# Patient Record
Sex: MALE | Race: WHITE | NOT HISPANIC OR LATINO
[De-identification: names, ages, dates, MRNs, and addresses within clinical notes are randomized per-mention and may not be internally consistent; named-entity substitution may affect disease eponyms.]

---

## 2017-06-25 PROBLEM — Z00.00 ENCOUNTER FOR PREVENTIVE HEALTH EXAMINATION: Status: ACTIVE | Noted: 2017-06-25

## 2017-08-18 ENCOUNTER — APPOINTMENT (OUTPATIENT)
Dept: VASCULAR SURGERY | Facility: CLINIC | Age: 77
End: 2017-08-18
Payer: MEDICARE

## 2017-08-18 VITALS
SYSTOLIC BLOOD PRESSURE: 124 MMHG | WEIGHT: 190 LBS | DIASTOLIC BLOOD PRESSURE: 72 MMHG | HEIGHT: 71 IN | BODY MASS INDEX: 26.6 KG/M2

## 2017-08-18 DIAGNOSIS — I10 ESSENTIAL (PRIMARY) HYPERTENSION: ICD-10-CM

## 2017-08-18 DIAGNOSIS — Z87.891 PERSONAL HISTORY OF NICOTINE DEPENDENCE: ICD-10-CM

## 2017-08-18 DIAGNOSIS — E78.00 PURE HYPERCHOLESTEROLEMIA, UNSPECIFIED: ICD-10-CM

## 2017-08-18 PROCEDURE — 93978 VASCULAR STUDY: CPT

## 2017-08-18 PROCEDURE — 99213 OFFICE O/P EST LOW 20 MIN: CPT

## 2017-08-18 RX ORDER — ATORVASTATIN CALCIUM 10 MG/1
10 TABLET, FILM COATED ORAL
Refills: 0 | Status: ACTIVE | COMMUNITY

## 2017-08-18 RX ORDER — PNV NO.95/FERROUS FUM/FOLIC AC 28MG-0.8MG
TABLET ORAL
Refills: 0 | Status: ACTIVE | COMMUNITY

## 2017-08-18 RX ORDER — VALSARTAN 80 MG/1
80 TABLET, COATED ORAL
Refills: 0 | Status: ACTIVE | COMMUNITY

## 2018-02-13 ENCOUNTER — APPOINTMENT (OUTPATIENT)
Dept: VASCULAR SURGERY | Facility: CLINIC | Age: 78
End: 2018-02-13
Payer: MEDICARE

## 2018-02-13 VITALS
BODY MASS INDEX: 26.6 KG/M2 | SYSTOLIC BLOOD PRESSURE: 130 MMHG | WEIGHT: 190 LBS | DIASTOLIC BLOOD PRESSURE: 70 MMHG | HEIGHT: 71 IN

## 2018-02-13 PROCEDURE — 93978 VASCULAR STUDY: CPT

## 2018-02-13 PROCEDURE — 99213 OFFICE O/P EST LOW 20 MIN: CPT

## 2019-02-21 ENCOUNTER — OTHER (OUTPATIENT)
Age: 79
End: 2019-02-21

## 2019-02-22 ENCOUNTER — APPOINTMENT (OUTPATIENT)
Dept: VASCULAR SURGERY | Facility: CLINIC | Age: 79
End: 2019-02-22
Payer: MEDICARE

## 2019-02-22 VITALS
WEIGHT: 190 LBS | DIASTOLIC BLOOD PRESSURE: 80 MMHG | SYSTOLIC BLOOD PRESSURE: 130 MMHG | BODY MASS INDEX: 26.6 KG/M2 | HEIGHT: 71 IN

## 2019-02-22 PROCEDURE — 99213 OFFICE O/P EST LOW 20 MIN: CPT

## 2019-02-22 PROCEDURE — 93978 VASCULAR STUDY: CPT

## 2019-02-22 NOTE — HISTORY OF PRESENT ILLNESS
[FreeTextEntry1] : 77 y/o gentleman with h/o AAA who underwent EVAR in December 2014, presents for routine follow up.

## 2019-02-22 NOTE — ASSESSMENT
[FreeTextEntry1] : 77 y/o gentleman with h/o AAA who underwent EVAR in December 2014, presents for routine follow up. I performed an arterial duplex which was medically necessary to evaluate for patency of the endograft. It showed patent endograft without any endoleak and residual sac decreased to 3.5 cm. No need for a CTA at this point. Followup duplex in 6 months

## 2019-02-22 NOTE — DATA REVIEWED
[FreeTextEntry1] : I performed an arterial duplex which was medically necessary to evaluate for patency of the endograft. It showed patent endograft without any endoleak and residual sac decreased to 3.5 cm.\par

## 2019-08-23 ENCOUNTER — APPOINTMENT (OUTPATIENT)
Dept: VASCULAR SURGERY | Facility: CLINIC | Age: 79
End: 2019-08-23

## 2019-09-13 ENCOUNTER — APPOINTMENT (OUTPATIENT)
Dept: VASCULAR SURGERY | Facility: CLINIC | Age: 79
End: 2019-09-13
Payer: MEDICARE

## 2019-09-13 VITALS
DIASTOLIC BLOOD PRESSURE: 84 MMHG | BODY MASS INDEX: 26.6 KG/M2 | HEIGHT: 71 IN | WEIGHT: 190 LBS | SYSTOLIC BLOOD PRESSURE: 126 MMHG

## 2019-09-13 PROCEDURE — 99213 OFFICE O/P EST LOW 20 MIN: CPT

## 2019-09-13 PROCEDURE — 93978 VASCULAR STUDY: CPT

## 2019-09-13 NOTE — ASSESSMENT
[FreeTextEntry1] : 80 y/o gentleman with h/o AAA who underwent EVAR in December 2014, presents for routine follow up. I performed an arterial duplex which was medically necessary to evaluate for patency of the endograft. It showed patent endograft without any endoleak and residual sac decreased to 3.4 cm. No need for a CTA at this point. Followup duplex in one years time.

## 2019-09-13 NOTE — DATA REVIEWED
[FreeTextEntry1] : I performed an arterial duplex which was medically necessary to evaluate for patency of the endograft. It showed patent endograft without any endoleak and residual sac decreased to 3.4 cm.\par

## 2019-09-13 NOTE — HISTORY OF PRESENT ILLNESS
[FreeTextEntry1] : 78 y/o gentleman with h/o AAA who underwent EVAR in December 2014, presents for routine follow up. Denies any complaints.

## 2020-05-15 ENCOUNTER — APPOINTMENT (OUTPATIENT)
Dept: CARDIOLOGY | Facility: CLINIC | Age: 80
End: 2020-05-15

## 2020-06-22 ENCOUNTER — OUTPATIENT (OUTPATIENT)
Dept: OUTPATIENT SERVICES | Facility: HOSPITAL | Age: 80
LOS: 1 days | Discharge: HOME | End: 2020-06-22
Payer: MEDICARE

## 2020-06-22 VITALS
HEART RATE: 70 BPM | SYSTOLIC BLOOD PRESSURE: 132 MMHG | HEIGHT: 71 IN | TEMPERATURE: 97 F | WEIGHT: 176.37 LBS | RESPIRATION RATE: 16 BRPM | OXYGEN SATURATION: 99 % | DIASTOLIC BLOOD PRESSURE: 78 MMHG

## 2020-06-22 DIAGNOSIS — K40.90 UNILATERAL INGUINAL HERNIA, WITHOUT OBSTRUCTION OR GANGRENE, NOT SPECIFIED AS RECURRENT: ICD-10-CM

## 2020-06-22 DIAGNOSIS — Z95.828 PRESENCE OF OTHER VASCULAR IMPLANTS AND GRAFTS: Chronic | ICD-10-CM

## 2020-06-22 DIAGNOSIS — Z01.818 ENCOUNTER FOR OTHER PREPROCEDURAL EXAMINATION: ICD-10-CM

## 2020-06-22 LAB
ALBUMIN SERPL ELPH-MCNC: 4.8 G/DL — SIGNIFICANT CHANGE UP (ref 3.5–5.2)
ALP SERPL-CCNC: 73 U/L — SIGNIFICANT CHANGE UP (ref 30–115)
ALT FLD-CCNC: 18 U/L — SIGNIFICANT CHANGE UP (ref 0–41)
ANION GAP SERPL CALC-SCNC: 14 MMOL/L — SIGNIFICANT CHANGE UP (ref 7–14)
APPEARANCE UR: CLEAR — SIGNIFICANT CHANGE UP
APTT BLD: 27.7 SEC — SIGNIFICANT CHANGE UP (ref 27–39.2)
AST SERPL-CCNC: 15 U/L — SIGNIFICANT CHANGE UP (ref 0–41)
BACTERIA # UR AUTO: NEGATIVE — SIGNIFICANT CHANGE UP
BASOPHILS # BLD AUTO: 0.06 K/UL — SIGNIFICANT CHANGE UP (ref 0–0.2)
BASOPHILS NFR BLD AUTO: 0.8 % — SIGNIFICANT CHANGE UP (ref 0–1)
BILIRUB SERPL-MCNC: 0.5 MG/DL — SIGNIFICANT CHANGE UP (ref 0.2–1.2)
BILIRUB UR-MCNC: NEGATIVE — SIGNIFICANT CHANGE UP
BUN SERPL-MCNC: 28 MG/DL — HIGH (ref 10–20)
CALCIUM SERPL-MCNC: 9.4 MG/DL — SIGNIFICANT CHANGE UP (ref 8.5–10.1)
CHLORIDE SERPL-SCNC: 104 MMOL/L — SIGNIFICANT CHANGE UP (ref 98–110)
CO2 SERPL-SCNC: 25 MMOL/L — SIGNIFICANT CHANGE UP (ref 17–32)
COLOR SPEC: YELLOW — SIGNIFICANT CHANGE UP
CREAT SERPL-MCNC: 1.5 MG/DL — SIGNIFICANT CHANGE UP (ref 0.7–1.5)
DIFF PNL FLD: NEGATIVE — SIGNIFICANT CHANGE UP
EOSINOPHIL # BLD AUTO: 0.09 K/UL — SIGNIFICANT CHANGE UP (ref 0–0.7)
EOSINOPHIL NFR BLD AUTO: 1.2 % — SIGNIFICANT CHANGE UP (ref 0–8)
EPI CELLS # UR: 0 /HPF — SIGNIFICANT CHANGE UP (ref 0–5)
GLUCOSE SERPL-MCNC: 66 MG/DL — LOW (ref 70–99)
GLUCOSE UR QL: NEGATIVE — SIGNIFICANT CHANGE UP
HCT VFR BLD CALC: 46.3 % — SIGNIFICANT CHANGE UP (ref 42–52)
HGB BLD-MCNC: 15.4 G/DL — SIGNIFICANT CHANGE UP (ref 14–18)
HYALINE CASTS # UR AUTO: 0 /LPF — SIGNIFICANT CHANGE UP (ref 0–7)
IMM GRANULOCYTES NFR BLD AUTO: 0.4 % — HIGH (ref 0.1–0.3)
INR BLD: 1.07 RATIO — SIGNIFICANT CHANGE UP (ref 0.65–1.3)
KETONES UR-MCNC: NEGATIVE — SIGNIFICANT CHANGE UP
LEUKOCYTE ESTERASE UR-ACNC: NEGATIVE — SIGNIFICANT CHANGE UP
LYMPHOCYTES # BLD AUTO: 2.07 K/UL — SIGNIFICANT CHANGE UP (ref 1.2–3.4)
LYMPHOCYTES # BLD AUTO: 26.5 % — SIGNIFICANT CHANGE UP (ref 20.5–51.1)
MCHC RBC-ENTMCNC: 30.4 PG — SIGNIFICANT CHANGE UP (ref 27–31)
MCHC RBC-ENTMCNC: 33.3 G/DL — SIGNIFICANT CHANGE UP (ref 32–37)
MCV RBC AUTO: 91.5 FL — SIGNIFICANT CHANGE UP (ref 80–94)
MONOCYTES # BLD AUTO: 0.72 K/UL — HIGH (ref 0.1–0.6)
MONOCYTES NFR BLD AUTO: 9.2 % — SIGNIFICANT CHANGE UP (ref 1.7–9.3)
NEUTROPHILS # BLD AUTO: 4.83 K/UL — SIGNIFICANT CHANGE UP (ref 1.4–6.5)
NEUTROPHILS NFR BLD AUTO: 61.9 % — SIGNIFICANT CHANGE UP (ref 42.2–75.2)
NITRITE UR-MCNC: NEGATIVE — SIGNIFICANT CHANGE UP
NRBC # BLD: 0 /100 WBCS — SIGNIFICANT CHANGE UP (ref 0–0)
PH UR: 6 — SIGNIFICANT CHANGE UP (ref 5–8)
PLATELET # BLD AUTO: 145 K/UL — SIGNIFICANT CHANGE UP (ref 130–400)
POTASSIUM SERPL-MCNC: 4.4 MMOL/L — SIGNIFICANT CHANGE UP (ref 3.5–5)
POTASSIUM SERPL-SCNC: 4.4 MMOL/L — SIGNIFICANT CHANGE UP (ref 3.5–5)
PROT SERPL-MCNC: 7 G/DL — SIGNIFICANT CHANGE UP (ref 6–8)
PROT UR-MCNC: ABNORMAL
PROTHROM AB SERPL-ACNC: 12.3 SEC — SIGNIFICANT CHANGE UP (ref 9.95–12.87)
RBC # BLD: 5.06 M/UL — SIGNIFICANT CHANGE UP (ref 4.7–6.1)
RBC # FLD: 12.7 % — SIGNIFICANT CHANGE UP (ref 11.5–14.5)
RBC CASTS # UR COMP ASSIST: 1 /HPF — SIGNIFICANT CHANGE UP (ref 0–4)
SODIUM SERPL-SCNC: 143 MMOL/L — SIGNIFICANT CHANGE UP (ref 135–146)
SP GR SPEC: 1.02 — SIGNIFICANT CHANGE UP (ref 1.01–1.02)
UROBILINOGEN FLD QL: SIGNIFICANT CHANGE UP
WBC # BLD: 7.8 K/UL — SIGNIFICANT CHANGE UP (ref 4.8–10.8)
WBC # FLD AUTO: 7.8 K/UL — SIGNIFICANT CHANGE UP (ref 4.8–10.8)
WBC UR QL: 2 /HPF — SIGNIFICANT CHANGE UP (ref 0–5)

## 2020-06-22 PROCEDURE — 71046 X-RAY EXAM CHEST 2 VIEWS: CPT | Mod: 26

## 2020-06-22 PROCEDURE — 93010 ELECTROCARDIOGRAM REPORT: CPT

## 2020-06-22 NOTE — H&P PST ADULT - REASON FOR ADMISSION
80 yr old man to past for laparoscopic inguinal hernia repair left side  noted x 3 mos egg sized reducible opt for surgery NO recent fever no uti yani cp palp sob pain at this time exercise tolerance is 2 flights no changes kanu screen revd neg no s/s covid has appt 6/23 for testing

## 2020-06-22 NOTE — H&P PST ADULT - NSICDXPASTMEDICALHX_GEN_ALL_CORE_FT
PAST MEDICAL HISTORY:  BPH (benign prostatic hyperplasia)     High cholesterol     S/P AAA repair 2014 stent 2014

## 2020-06-26 ENCOUNTER — OUTPATIENT (OUTPATIENT)
Dept: OUTPATIENT SERVICES | Facility: HOSPITAL | Age: 80
LOS: 1 days | Discharge: HOME | End: 2020-06-26

## 2020-06-26 VITALS
TEMPERATURE: 98 F | RESPIRATION RATE: 20 BRPM | DIASTOLIC BLOOD PRESSURE: 68 MMHG | HEART RATE: 56 BPM | OXYGEN SATURATION: 95 % | SYSTOLIC BLOOD PRESSURE: 136 MMHG | WEIGHT: 175.05 LBS | HEIGHT: 71 IN

## 2020-06-26 VITALS
DIASTOLIC BLOOD PRESSURE: 63 MMHG | OXYGEN SATURATION: 100 % | SYSTOLIC BLOOD PRESSURE: 133 MMHG | RESPIRATION RATE: 25 BRPM | HEART RATE: 57 BPM

## 2020-06-26 DIAGNOSIS — Z95.828 PRESENCE OF OTHER VASCULAR IMPLANTS AND GRAFTS: Chronic | ICD-10-CM

## 2020-06-26 RX ORDER — OXYCODONE HYDROCHLORIDE 5 MG/1
5 TABLET ORAL ONCE
Refills: 0 | Status: DISCONTINUED | OUTPATIENT
Start: 2020-06-26 | End: 2020-06-26

## 2020-06-26 RX ORDER — HYDROMORPHONE HYDROCHLORIDE 2 MG/ML
0.5 INJECTION INTRAMUSCULAR; INTRAVENOUS; SUBCUTANEOUS
Refills: 0 | Status: DISCONTINUED | OUTPATIENT
Start: 2020-06-26 | End: 2020-06-26

## 2020-06-26 RX ORDER — SODIUM CHLORIDE 9 MG/ML
1000 INJECTION, SOLUTION INTRAVENOUS
Refills: 0 | Status: DISCONTINUED | OUTPATIENT
Start: 2020-06-26 | End: 2020-07-11

## 2020-06-26 RX ORDER — TAMSULOSIN HYDROCHLORIDE 0.4 MG/1
1 CAPSULE ORAL
Qty: 0 | Refills: 0 | DISCHARGE

## 2020-06-26 RX ORDER — ONDANSETRON 8 MG/1
4 TABLET, FILM COATED ORAL ONCE
Refills: 0 | Status: DISCONTINUED | OUTPATIENT
Start: 2020-06-26 | End: 2020-07-11

## 2020-06-26 RX ORDER — ATORVASTATIN CALCIUM 80 MG/1
1 TABLET, FILM COATED ORAL
Qty: 0 | Refills: 0 | DISCHARGE

## 2020-06-26 RX ORDER — ACETAMINOPHEN 500 MG
2 TABLET ORAL
Qty: 0 | Refills: 0 | DISCHARGE

## 2020-06-26 NOTE — ASU DISCHARGE PLAN (ADULT/PEDIATRIC) - CARE PROVIDER_API CALL
Alonso Betancourt S  SURGERY  [address] [place] [id number]  Phone: [phone]  Fax: [phone]  Follow Up Time: 1 week

## 2020-06-26 NOTE — ASU DISCHARGE PLAN (ADULT/PEDIATRIC) - CALL YOUR DOCTOR IF YOU HAVE ANY OF THE FOLLOWING:
Bleeding that does not stop/Swelling that gets worse/Fever greater than (need to indicate Fahrenheit or Celsius)

## 2020-06-26 NOTE — BRIEF OPERATIVE NOTE - NSICDXBRIEFPROCEDURE_GEN_ALL_CORE_FT
PROCEDURES:  Laparoscopic herniorrhaphy of left inguinal hernia by total extraperitoneal approach 26-Jun-2020 12:39:35  Colby Shelby

## 2020-06-26 NOTE — CHART NOTE - NSCHARTNOTEFT_GEN_A_CORE
PACU ANESTHESIA ADMISSION NOTE      Procedure: Laparoscopic herniorrhaphy of left inguinal hernia by total extraperitoneal approach    Post op diagnosis:  Left inguinal hernia      ____  Intubated  TV:______       Rate: ______      FiO2: ______    _x___  Patent Airway    _x___  Full return of protective reflexes    _x___  Full recovery from anesthesia / back to baseline status    Vitals:  T 97.7 F  HR:71  BP: 118/70  RR: 17  SpO2: 98% on NC 2 L/min    Mental Status:  _x___ Awake   __x___ Alert   _____ Drowsy   _____ Sedated    Nausea/Vomiting:  _x___  NO       ______Yes,   See Post - Op Orders         Pain Scale (0-10):  __0___    Treatment: _x___ None    ____ See Post - Op/PCA Orders    Post - Operative Fluids:   ___ Oral   __x__ See Post - Op Orders    Plan: Discharge:   _x___Home       _____Floor     _____Critical Care    _____  Other:_________________    Comments: uneventful GETA anesthetic, VS stable, full report to PACU RN.   No anesthesia issues or complications noted.  Discharge when criteria met.

## 2020-07-02 DIAGNOSIS — Z87.891 PERSONAL HISTORY OF NICOTINE DEPENDENCE: ICD-10-CM

## 2020-07-02 DIAGNOSIS — K40.90 UNILATERAL INGUINAL HERNIA, WITHOUT OBSTRUCTION OR GANGRENE, NOT SPECIFIED AS RECURRENT: ICD-10-CM

## 2020-07-02 DIAGNOSIS — E78.00 PURE HYPERCHOLESTEROLEMIA, UNSPECIFIED: ICD-10-CM

## 2020-08-25 PROBLEM — N40.0 BENIGN PROSTATIC HYPERPLASIA WITHOUT LOWER URINARY TRACT SYMPTOMS: Chronic | Status: ACTIVE | Noted: 2020-06-22

## 2020-08-25 PROBLEM — E78.00 PURE HYPERCHOLESTEROLEMIA, UNSPECIFIED: Chronic | Status: ACTIVE | Noted: 2020-06-22

## 2020-08-25 PROBLEM — Z98.890 OTHER SPECIFIED POSTPROCEDURAL STATES: Chronic | Status: ACTIVE | Noted: 2020-06-22

## 2020-09-18 ENCOUNTER — APPOINTMENT (OUTPATIENT)
Dept: VASCULAR SURGERY | Facility: CLINIC | Age: 80
End: 2020-09-18

## 2020-09-18 NOTE — H&P PST ADULT - TEACHING/LEARNING LEARNING PREFERENCES
Hold ACEI / ARB, + ARABELLA, on Labetalol, ASA, Start Norvasc 5 mg PO QD from tonight, ECHO noted Hold ACEI / ARB, + ARABELLA, on Labetalol, ASA, Start Norvasc 5 mg PO QD from tonight, ECHO noted   will continue to monitor BP verbal instruction/written material

## 2020-09-22 ENCOUNTER — APPOINTMENT (OUTPATIENT)
Dept: VASCULAR SURGERY | Facility: CLINIC | Age: 80
End: 2020-09-22
Payer: MEDICARE

## 2020-09-22 VITALS
WEIGHT: 175 LBS | HEIGHT: 71 IN | DIASTOLIC BLOOD PRESSURE: 72 MMHG | BODY MASS INDEX: 24.5 KG/M2 | SYSTOLIC BLOOD PRESSURE: 178 MMHG | TEMPERATURE: 97.4 F

## 2020-09-22 PROCEDURE — 93978 VASCULAR STUDY: CPT

## 2020-09-22 PROCEDURE — 99213 OFFICE O/P EST LOW 20 MIN: CPT

## 2020-09-22 NOTE — DATA REVIEWED
[FreeTextEntry1] : I performed an arterial duplex which was medically necessary to evaluate for patency of the endograft. It showed patent endograft without any endoleak and residual sac at 3.4 cm.\par

## 2020-09-22 NOTE — HISTORY OF PRESENT ILLNESS
[FreeTextEntry1] : 81 y/o gentleman with h/o AAA who underwent EVAR in December 2014, presents for routine follow up. Denies any complaints.\par He underwent left inguinal hernia repair with Dr. Betancourt in June 2020 and has recovered. denies pain/discomfort

## 2020-09-22 NOTE — ASSESSMENT
[FreeTextEntry1] : 79 y/o gentleman with h/o AAA who underwent EVAR in December 2014, presents for routine follow up. \par \par I performed an arterial duplex which was medically necessary to evaluate for patency of the endograft. It showed patent endograft without any endoleak and residual sac at 3.4 cm.\par \par I have advised follow up in one years time.

## 2020-12-16 NOTE — ASU PREOP CHECKLIST - BSA (M2)
Pt tolerated infusion without complaints.  VSS.  Pt declined AVS.  DC per ambulation after completion of visit.     1.99

## 2021-09-08 ENCOUNTER — TRANSCRIPTION ENCOUNTER (OUTPATIENT)
Age: 81
End: 2021-09-08

## 2021-09-15 ENCOUNTER — TRANSCRIPTION ENCOUNTER (OUTPATIENT)
Age: 81
End: 2021-09-15

## 2021-09-21 ENCOUNTER — APPOINTMENT (OUTPATIENT)
Dept: VASCULAR SURGERY | Facility: CLINIC | Age: 81
End: 2021-09-21
Payer: MEDICARE

## 2021-09-21 VITALS
WEIGHT: 176 LBS | DIASTOLIC BLOOD PRESSURE: 82 MMHG | BODY MASS INDEX: 24.64 KG/M2 | TEMPERATURE: 97.6 F | HEIGHT: 71 IN | HEART RATE: 66 BPM | SYSTOLIC BLOOD PRESSURE: 164 MMHG

## 2021-09-21 PROCEDURE — 99213 OFFICE O/P EST LOW 20 MIN: CPT

## 2021-09-21 PROCEDURE — 93978 VASCULAR STUDY: CPT

## 2021-09-21 NOTE — ASSESSMENT
[FreeTextEntry1] : 82 y/o gentleman with h/o AAA who underwent EVAR in December 2014, presents for routine follow up. \par \par I performed an arterial duplex which was medically necessary to evaluate for patency of the endograft. It showed patent endograft without any endoleak and residual sac at 3.4 cm.\par \par I have advised follow up in one years time.

## 2021-09-21 NOTE — HISTORY OF PRESENT ILLNESS
[FreeTextEntry1] : 82 y/o gentleman with h/o AAA who underwent EVAR in December 2014, presents for routine follow up. Denies any complaints.\par He underwent left inguinal hernia repair with Dr. Betancourt in June 2020 and has recovered.

## 2022-05-15 ENCOUNTER — EMERGENCY (EMERGENCY)
Facility: HOSPITAL | Age: 82
LOS: 0 days | Discharge: HOME | End: 2022-05-15
Attending: EMERGENCY MEDICINE | Admitting: EMERGENCY MEDICINE
Payer: MEDICARE

## 2022-05-15 VITALS
SYSTOLIC BLOOD PRESSURE: 131 MMHG | DIASTOLIC BLOOD PRESSURE: 69 MMHG | RESPIRATION RATE: 16 BRPM | HEIGHT: 71 IN | TEMPERATURE: 98 F | OXYGEN SATURATION: 98 % | WEIGHT: 175.05 LBS | HEART RATE: 71 BPM

## 2022-05-15 VITALS
RESPIRATION RATE: 18 BRPM | DIASTOLIC BLOOD PRESSURE: 70 MMHG | HEART RATE: 72 BPM | TEMPERATURE: 98 F | SYSTOLIC BLOOD PRESSURE: 125 MMHG | OXYGEN SATURATION: 100 %

## 2022-05-15 DIAGNOSIS — R21 RASH AND OTHER NONSPECIFIC SKIN ERUPTION: ICD-10-CM

## 2022-05-15 DIAGNOSIS — E78.5 HYPERLIPIDEMIA, UNSPECIFIED: ICD-10-CM

## 2022-05-15 DIAGNOSIS — Z95.828 PRESENCE OF OTHER VASCULAR IMPLANTS AND GRAFTS: Chronic | ICD-10-CM

## 2022-05-15 DIAGNOSIS — N40.0 BENIGN PROSTATIC HYPERPLASIA WITHOUT LOWER URINARY TRACT SYMPTOMS: ICD-10-CM

## 2022-05-15 DIAGNOSIS — Z86.79 PERSONAL HISTORY OF OTHER DISEASES OF THE CIRCULATORY SYSTEM: ICD-10-CM

## 2022-05-15 DIAGNOSIS — B02.9 ZOSTER WITHOUT COMPLICATIONS: ICD-10-CM

## 2022-05-15 LAB
ALBUMIN SERPL ELPH-MCNC: 4.9 G/DL — SIGNIFICANT CHANGE UP (ref 3.5–5.2)
ALP SERPL-CCNC: 72 U/L — SIGNIFICANT CHANGE UP (ref 30–115)
ALT FLD-CCNC: 19 U/L — SIGNIFICANT CHANGE UP (ref 0–41)
ANION GAP SERPL CALC-SCNC: 15 MMOL/L — HIGH (ref 7–14)
AST SERPL-CCNC: 18 U/L — SIGNIFICANT CHANGE UP (ref 0–41)
BILIRUB SERPL-MCNC: 1.7 MG/DL — HIGH (ref 0.2–1.2)
BUN SERPL-MCNC: 26 MG/DL — HIGH (ref 10–20)
CALCIUM SERPL-MCNC: 9.4 MG/DL — SIGNIFICANT CHANGE UP (ref 8.5–10.1)
CHLORIDE SERPL-SCNC: 97 MMOL/L — LOW (ref 98–110)
CO2 SERPL-SCNC: 24 MMOL/L — SIGNIFICANT CHANGE UP (ref 17–32)
CREAT SERPL-MCNC: 1.6 MG/DL — HIGH (ref 0.7–1.5)
EGFR: 43 ML/MIN/1.73M2 — LOW
GLUCOSE SERPL-MCNC: 105 MG/DL — HIGH (ref 70–99)
HCT VFR BLD CALC: 49.6 % — SIGNIFICANT CHANGE UP (ref 42–52)
HGB BLD-MCNC: 17 G/DL — SIGNIFICANT CHANGE UP (ref 14–18)
MCHC RBC-ENTMCNC: 31.1 PG — HIGH (ref 27–31)
MCHC RBC-ENTMCNC: 34.3 G/DL — SIGNIFICANT CHANGE UP (ref 32–37)
MCV RBC AUTO: 90.7 FL — SIGNIFICANT CHANGE UP (ref 80–94)
NRBC # BLD: 0 /100 WBCS — SIGNIFICANT CHANGE UP (ref 0–0)
PLATELET # BLD AUTO: 107 K/UL — LOW (ref 130–400)
POTASSIUM SERPL-MCNC: 4 MMOL/L — SIGNIFICANT CHANGE UP (ref 3.5–5)
POTASSIUM SERPL-SCNC: 4 MMOL/L — SIGNIFICANT CHANGE UP (ref 3.5–5)
PROT SERPL-MCNC: 7.3 G/DL — SIGNIFICANT CHANGE UP (ref 6–8)
RBC # BLD: 5.47 M/UL — SIGNIFICANT CHANGE UP (ref 4.7–6.1)
RBC # FLD: 12.8 % — SIGNIFICANT CHANGE UP (ref 11.5–14.5)
SODIUM SERPL-SCNC: 136 MMOL/L — SIGNIFICANT CHANGE UP (ref 135–146)
WBC # BLD: 6.86 K/UL — SIGNIFICANT CHANGE UP (ref 4.8–10.8)
WBC # FLD AUTO: 6.86 K/UL — SIGNIFICANT CHANGE UP (ref 4.8–10.8)

## 2022-05-15 PROCEDURE — 99284 EMERGENCY DEPT VISIT MOD MDM: CPT

## 2022-05-15 RX ORDER — VALACYCLOVIR 500 MG/1
1 TABLET, FILM COATED ORAL
Qty: 21 | Refills: 0
Start: 2022-05-15 | End: 2022-05-21

## 2022-05-15 RX ORDER — VALACYCLOVIR 500 MG/1
1000 TABLET, FILM COATED ORAL ONCE
Refills: 0 | Status: DISCONTINUED | OUTPATIENT
Start: 2022-05-15 | End: 2022-05-15

## 2022-05-15 RX ORDER — ACYCLOVIR SODIUM 500 MG
1 VIAL (EA) INTRAVENOUS
Qty: 35 | Refills: 0
Start: 2022-05-15 | End: 2022-05-21

## 2022-05-15 RX ORDER — ACYCLOVIR SODIUM 500 MG
800 VIAL (EA) INTRAVENOUS ONCE
Refills: 0 | Status: COMPLETED | OUTPATIENT
Start: 2022-05-15 | End: 2022-05-15

## 2022-05-15 NOTE — ED PROVIDER NOTE - NS ED ROS FT
Review of Systems:  CONSTITUTIONAL: No fever, No diaphoresis, No weight change  SKIN: + rash to left upper back  HEMATOLOGIC: No abnormal bleeding or bruising  EYES: No eye pain, No blurred vision  ENT: No change in hearing, No sore throat, No neck pain, No rhinorrhea, No ear pain  RESPIRATORY: No shortness of breath, No cough  CARDIAC: No chest pain, No palpitations  GI: No abdominal pain, No nausea, No vomiting, No diarrhea, No constipation, No bright red blood per rectum or melena. No flank pain  : No dysuria, frequency, hematuria.   ENDO: No polydypsia, No polyuria, No heat/cold intolerance  MUSCULOSKELETAL: No joint paint, No swelling, No back pain  NEUROLOGIC: No numbness, No focal weakness, No headache, No dizziness  All other systems negative, unless specified in HPI

## 2022-05-15 NOTE — ED PROVIDER NOTE - OBJECTIVE STATEMENT
Pt is an 81 y/o male with PMH of AAA repair (2014), HLD and BPH presenting for rash x 1 week. Pt reports a painful red rash on his left upper back that has now started to extend under his left breast. Had a temp of 101 yesterday. Has been taking advil for pain. No cough, chest pain, nausea/vomiting or diarrhea.

## 2022-05-15 NOTE — ED PROVIDER NOTE - PHYSICAL EXAMINATION
CONST: well appearing for age  HEAD:  normocephalic, atraumatic  EYES:  conjunctivae without injection, drainage or discharge  ENMT:  tympanic membranes pearly gray with normal landmarks; nasal mucosa moist; mouth moist without ulcerations or lesions; throat moist without erythema, exudate, ulcerations or lesions  NECK:  supple  CARDIAC:  regular rate and rhythm, normal S1 and S2, no murmurs, rubs or gallops  RESP:  respiratory rate and effort appear normal for age; lungs are clear to auscultation bilaterally; no rales or wheezes  ABDOMEN:  soft, nontender, nondistended  LYMPHATICS:  no significant lymphadenopathy  MUSCULOSKELETAL/NEURO:  AAOX3, CN II-XII grossly intact, sensation intact throughout, normal movement, normal tone  SKIN: + erythematous vesicular mildy tender rash to T6 dermatome

## 2022-05-15 NOTE — ED ADULT TRIAGE NOTE - BP NONINVASIVE SYSTOLIC (MM HG)
131 Provider Procedure Text (D): After obtaining clear surgical margins the defect was repaired by another provider.

## 2022-05-15 NOTE — ED PROVIDER NOTE - NSFOLLOWUPCLINICS_GEN_ALL_ED_FT
[facility]  Medicine  [address] [place], [place]   Phone: [phone]  Fax:   Follow Up Time: 7-10 Days

## 2022-05-15 NOTE — ED ADULT NURSE NOTE - OBJECTIVE STATEMENT
C/o fever and rash x 1 week. Area appears red and inflamed with small area of blackened skin in center of rash

## 2022-05-15 NOTE — ED PROVIDER NOTE - ATTENDING CONTRIBUTION TO CARE
82 y.o. male, PMH of AAA repair (2014), HLD and BPH presenting for rash x 1 week. Pt reports a painful red rash on his left upper back that has now started to extend under his left breast. Had a temp of 101 yesterday. Has been taking advil for pain. No pain today. No cough, chest pain, nausea/vomiting or diarrhea. On exam, pt in NAD, AAOx3, head NC/AT, CN II-XII intact, lungs CTA B/L, CV S1S2 regular, abdomen soft/NT/ND/(+)BS, skin (+) erythematous vesicular rash in dermatomal distribution (T6), ext (-) edema, motor 5/5x4, sensation intact. A/P: shingles. Will d/c with Valtrex. Advised to return for worsening of symptoms. Advised to follow up with PMD.

## 2022-05-15 NOTE — ED ADULT NURSE NOTE - NSIMPLEMENTINTERV_GEN_ALL_ED
Implemented All Universal Safety Interventions:  [place] to call system. Call bell, personal items and telephone within reach. Instruct patient to call for assistance. Room bathroom lighting operational. Non-slip footwear when patient is off stretcher. Physically safe environment: no spills, clutter or unnecessary equipment. Stretcher in lowest position, wheels locked, appropriate side rails in place.

## 2022-05-15 NOTE — ED PROVIDER NOTE - PATIENT PORTAL LINK FT
You can access the FollowMyHealth Patient Portal offered by [facility] by registering at the following website: [email or web address]. By joining [employer]’s FollowMyHealth portal, you will also be able to view your health information using other applications (apps) compatible with our system.

## 2022-05-15 NOTE — ED ADULT NURSE NOTE - NSSEPSISSUSPECTED_ED_A_ED
"  History     Chief Complaint:  Abdominal Pain    HPI   Anatoliy Kenney is a 48 year old male, otherwise healthy, who presents with his wife to the emergency department for evaluation of upper epigastric abdominal pain. The patient reports the pain started about 1900 this evening, tried to vomit but was unable, then woke up with worsened pain around 0100 this morning. He reports the pain is achy and constant at a 7-8/10 and he is experiencing some lightheadedness and diaphoresis. He denies any blood in his stool.    Allergies:  No known drug allergies     Medications:    The patient is not currently taking any prescribed medications.    Past Medical History:    The patient does not have any past pertinent medical history.    Past Surgical History:    History reviewed. No pertinent surgical history.    Family History:    History reviewed. No pertinent family history.     Social History:  Smoking status: No  Alcohol use: Yes, once a week  The patient presents to the emergency department with his wife.  Marital Status:   [2]     Review of Systems   Constitutional: Positive for diaphoresis.   Gastrointestinal: Positive for abdominal pain. Negative for blood in stool.   Neurological: Positive for light-headedness.   All other systems reviewed and are negative.    Physical Exam   Patient Vitals for the past 24 hrs:   BP Temp Temp src Pulse Heart Rate Resp SpO2 Height Weight   08/11/18 0430 165/90 - - - - - 96 % - -   08/11/18 0425 (!) 169/91 - - - 45 - 97 % - -   08/11/18 0330 153/90 - - - 48 - 100 % - -   08/11/18 0315 178/87 - - - 63 - 99 % - -   08/11/18 0309 (!) 173/95 - - - - - 98 % - -   08/11/18 0302 (!) 172/97 - - - - - 97 % - -   08/11/18 0223 - 98  F (36.7  C) Temporal 94 - 20 96 % 1.905 m (6' 3\") 104.3 kg (230 lb)     Physical Exam  Constitutional: Well developed, nontox appearance, appears uncomfortable   Head: Atraumatic.   Mouth/Throat: Oropharynx is clear and moist.   Neck:  no stridor  Eyes: no " scleral icterus  Cardiovascular: Reg bradycardia, 2+ bilat radial pulses  Pulmonary/Chest: nml resp effort, Clear BS bilat  Abdominal: ND, +BS, soft, RUQ tenderness, absent Cox's no rebound or guarding   Ext: Warm, well perfused, no edema  Neurological: A&O, symmetric facies, moves ext x4  Skin: Skin is warm and dry.   Psychiatric: Behavior is normal. Thought content normal.   Nursing note and vitals reviewed.      Emergency Department Course   ECG (2:30:22):  Rate 40 bpm. IN interval 150. QRS duration 86. QT/QTc 484/394. P-R-T axes -16 0 41. Marked sinus bradycardia. Abnormal ECG. Interpreted at 0238 by Rai Chen MD.    Imaging:  Radiographic findings were communicated with the patient and family who voiced understanding of the findings.    Abdomen US, limited (RUQ only)  IMPRESSION: Multiple gallstones and focal right upper quadrant  tenderness. No evidence of gallbladder wall thickening or  pericholecystic fluid.  As read by Radiology.    Laboratory:  CBC: WBC 11.7 o/w WNL (HGB 15.3, )  CMP: Glucose 121 (H) o/w WNL (Creatinine 0.99)    Lipase: 73  Troponin I (0256): <0.015    Interventions:  0254 NS 1L IV Bolus  0303 GI cocktail 30 mL PO  0304 Atropine 0.5 mg Injection  0404 Morphine 4 mg IV  0500 NS 1L IV Bolus  0509 Oxycodone 10 mg PO  0510 Zofran 4 mg IV    Emergency Department Course:  Past medical records, nursing notes, and vitals reviewed.  0233: I performed an exam of the patient and obtained history, as documented above.     IV inserted and blood drawn.    The patient was sent for an abdominal ultrasound while in the emergency department, findings above.    0536: I rechecked the patient. Explained findings to the patient and his wife.    Findings and plan explained to the Patient. Patient discharged home with instructions regarding supportive care, medications, and reasons to return. The importance of close follow-up was reviewed.   Impression & Plan    Medical Decision  Making:  Anatoliy Kenney is a 48 year old male who presents for evaluation of abdominal pain in the RUQ.  This patient has symptoms consistent with gallstones and biliary colic.  Ultrasound has confirmed the presence of gallstones.  There is no clinical, laboratory, or ultrasound evidence of choledocholithiasis, gallstone pancreatitis, or ascending cholangitis.  I doubt perforated ulcer, diverticulitis, colitis.  Presentation and work up is consistent with biliary colic.  Pt offered admission for further pain control and possible expedited cholecystectomy vs DC with outpt symptom mgmt.  Pt elected to try outpt management.   Recommendations given regarding follow up with [facility] and return to the emergency department as needed for new or worsening symptoms.  Counseled on all results, disposition and diagnosis.  Pt understanding and agreeable to plan. Patient discharged in stab condition.        Diagnosis:    ICD-10-CM   1. Biliary colic K80.50   2. Calculus of gallbladder without cholecystitis without obstruction K80.20     Disposition:  Discharged to home with close follow up.    Discharge Medications:  New Prescriptions    ONDANSETRON (ZOFRAN ODT) 4 MG ODT TAB    Take 1 tablet (4 mg) by mouth every 6 hours as needed    OXYCODONE IR (ROXICODONE) 5 MG TABLET    Take 1 tablet (5 mg) by mouth every 6 hours as needed for pain    SENNA-DOCUSATE (MIKLAA-COLACE) 8.6-50 MG PER TABLET    Take 1-2 tablets by mouth 2 times daily as needed for constipation     Saul Johnson  8/11/2018   [facility] EMERGENCY DEPARTMENT  Scribe Disclosure:  I, Saul Johnson, am serving as a scribe at 2:33 AM on 8/11/2018 to document services personally performed by Rai Chen MD based on my observations and the provider's statements to me.      Rai Chen MD  08/11/18 3127     No

## 2022-09-20 ENCOUNTER — APPOINTMENT (OUTPATIENT)
Dept: VASCULAR SURGERY | Facility: CLINIC | Age: 82
End: 2022-09-20

## 2022-09-20 VITALS — BODY MASS INDEX: 24.92 KG/M2 | WEIGHT: 178 LBS | HEIGHT: 71 IN

## 2022-09-20 PROCEDURE — 93978 VASCULAR STUDY: CPT

## 2022-09-20 PROCEDURE — 99214 OFFICE O/P EST MOD 30 MIN: CPT

## 2022-09-20 NOTE — HISTORY OF PRESENT ILLNESS
[FreeTextEntry1] : 81 y/o gentleman with h/o AAA who underwent EVAR in December 2014, presents for routine follow up. Denies any complaints.\par He underwent left inguinal hernia repair with Dr. Betancourt in June 2020 and has recovered.

## 2022-09-20 NOTE — ASSESSMENT
[FreeTextEntry1] : 83 y/o gentleman with h/o AAA who underwent EVAR in December 2014 by Dr. Marquez, presents for routine follow up. \par \par An aortic duplex today showed patent endograft without any endoleak and residual sac at 3.2 cm.\par \par I have advised follow up in one years time.

## 2022-09-20 NOTE — DATA REVIEWED
[FreeTextEntry1] : I performed an aortic duplex which was medically necessary to evaluate for patency of the endograft. It showed patent endograft without any endoleak and residual sac at 3.2 cm.\par

## 2023-01-25 NOTE — PRE-ANESTHESIA EVALUATION ADULT - NSANTHAPLANRD_GEN_ALL_CORE
Patient instructed on:  NPO after midnight.  Bring insurance card(s), photo ID, and phone number of ride.  No jewelery or body piercing's.  Wear comfortable, loose clothing appropriate for the procedure.  Advised to have someone home with him after the procedure.  Procedural prep instructions received and reviewed.  Aware of medication(s) to take DOS with sip of water and/or hold per anesthesia and surgeon guidelines.  To bring a form of payment if requested to pay a portion of bill at registration.      COVID Patient Instructions Given:    -Mask must be worn and stay on.  Visitor must also wear a mask.  -Visitors/ can drop off and , or may wait in car, but need to provide phone number so they can be contacted when procedure is finished  -Reading materials (magazines & newspapers) and beverages are not provided in the waiting areas.  Ok to bring something in from home.  -Patient advised to contact surgeon if symptoms develop or have known exposure prior to procedure.      Patient verbalizes understanding.         general

## 2023-09-19 ENCOUNTER — APPOINTMENT (OUTPATIENT)
Dept: VASCULAR SURGERY | Facility: CLINIC | Age: 83
End: 2023-09-19
Payer: MEDICARE

## 2023-09-19 VITALS
BODY MASS INDEX: 21 KG/M2 | WEIGHT: 150 LBS | SYSTOLIC BLOOD PRESSURE: 152 MMHG | DIASTOLIC BLOOD PRESSURE: 79 MMHG | HEIGHT: 71 IN

## 2023-09-19 DIAGNOSIS — I71.40 ABDOMINAL AORTIC ANEURYSM, WITHOUT RUPTURE, UNSPECIFIED: ICD-10-CM

## 2023-09-19 PROCEDURE — 99212 OFFICE O/P EST SF 10 MIN: CPT

## 2023-09-19 PROCEDURE — 93978 VASCULAR STUDY: CPT

## 2024-09-17 ENCOUNTER — APPOINTMENT (OUTPATIENT)
Dept: VASCULAR SURGERY | Facility: CLINIC | Age: 84
End: 2024-09-17
Payer: MEDICARE

## 2024-09-17 VITALS
BODY MASS INDEX: 21.7 KG/M2 | DIASTOLIC BLOOD PRESSURE: 67 MMHG | WEIGHT: 155 LBS | HEIGHT: 71 IN | SYSTOLIC BLOOD PRESSURE: 139 MMHG

## 2024-09-17 DIAGNOSIS — I71.40 ABDOMINAL AORTIC ANEURYSM, WITHOUT RUPTURE, UNSPECIFIED: ICD-10-CM

## 2024-09-17 PROCEDURE — 93978 VASCULAR STUDY: CPT

## 2024-09-17 PROCEDURE — 99212 OFFICE O/P EST SF 10 MIN: CPT

## 2024-09-17 NOTE — ASSESSMENT
[FreeTextEntry1] : 85 y/o gentleman with h/o AAA who underwent EVAR in December 2014 by Dr. Marquez, presents for routine follow up.   An aortic duplex today showed patent endograft without any endoleak and residual sac at 3.3 cm.  I have advised follow up in one years time.      I, Dr. Neri Santizo, personally performed the evaluation and management (E/M) services for this established patient who presents today with (a) new problem(s)/exacerbation of (an) existing condition(s). That E/M includes conducting the clinically appropriate interval history &/or exam, assessing all new/exacerbated conditions, and establishing a new plan of care. Today, my MIMA, Lorie Nunn PA-C, was here to observe my evaluation and management service for this new problem/exacerbated condition and follow the plan of care established by me going forward.

## 2024-09-17 NOTE — HISTORY OF PRESENT ILLNESS
[FreeTextEntry1] : 85 y/o gentleman with h/o AAA who underwent EVAR in December 2014, presents for routine follow up. Denies any complaints. He underwent left inguinal hernia repair with Dr. Betancourt in June 2020 and has recovered.

## 2024-09-17 NOTE — DATA REVIEWED
[FreeTextEntry1] : I performed an aortic duplex which was medically necessary to evaluate for patency of the endograft. It showed patent endograft without any endoleak and residual sac at 3.3 cm.

## 2024-12-19 ENCOUNTER — EMERGENCY (EMERGENCY)
Facility: HOSPITAL | Age: 84
LOS: 0 days | Discharge: HOME | End: 2024-12-19
Attending: STUDENT IN AN ORGANIZED HEALTH CARE EDUCATION/TRAINING PROGRAM
Payer: MEDICARE

## 2024-12-19 VITALS
WEIGHT: 169.98 LBS | RESPIRATION RATE: 17 BRPM | DIASTOLIC BLOOD PRESSURE: 80 MMHG | OXYGEN SATURATION: 98 % | HEIGHT: 71 IN | TEMPERATURE: 98 F | HEART RATE: 65 BPM | SYSTOLIC BLOOD PRESSURE: 149 MMHG

## 2024-12-19 DIAGNOSIS — Z95.828 PRESENCE OF OTHER VASCULAR IMPLANTS AND GRAFTS: Chronic | ICD-10-CM

## 2024-12-19 DIAGNOSIS — E78.00 PURE HYPERCHOLESTEROLEMIA, UNSPECIFIED: ICD-10-CM

## 2024-12-19 DIAGNOSIS — R68.84 JAW PAIN: ICD-10-CM

## 2024-12-19 PROCEDURE — 93005 ELECTROCARDIOGRAM TRACING: CPT

## 2024-12-19 PROCEDURE — 99285 EMERGENCY DEPT VISIT HI MDM: CPT | Mod: GC

## 2024-12-19 PROCEDURE — 99283 EMERGENCY DEPT VISIT LOW MDM: CPT | Mod: 25

## 2024-12-19 PROCEDURE — 93010 ELECTROCARDIOGRAM REPORT: CPT

## 2024-12-19 RX ORDER — AMOXICILLIN/POTASSIUM CLAV 250-125 MG
875 TABLET ORAL
Qty: 14 | Refills: 0
Start: 2024-12-19 | End: 2024-12-25

## 2024-12-19 RX ORDER — ACETAMINOPHEN 500MG 500 MG/1
650 TABLET, COATED ORAL ONCE
Refills: 0 | Status: COMPLETED | OUTPATIENT
Start: 2024-12-19 | End: 2024-12-19

## 2024-12-19 RX ADMIN — ACETAMINOPHEN 500MG 650 MILLIGRAM(S): 500 TABLET, COATED ORAL at 10:37

## 2024-12-19 NOTE — ED ADULT NURSE NOTE - NSFALLUNIVINTERV_ED_ALL_ED
Assistance with ambulation/Communicate risk of Fall with Harm to all staff, patient, and family/Monitor gait and stability/Monitor for mental status changes and reorient to person, place, and time, as needed/Reinforce activity limits and safety measures with patient and family/Use of alarms - bed, stretcher, chair and/or video monitoring/Bed/Stretcher in lowest position, wheels locked, appropriate side rails in place/Call bell, personal items and telephone in reach/Instruct patient to call for assistance before getting out of bed/chair/stretcher/Non-slip footwear applied when patient is off stretcher/[place] to call system/Physically safe environment - no spills, clutter or unnecessary equipment/Purposeful proactive rounding/Room/bathroom lighting operational, light cord in reach

## 2024-12-19 NOTE — ED PROVIDER NOTE - PHYSICAL EXAMINATION
Vitals: HD stable, O2 stable  Gen: appropriate affect, no acute distress  Neuro: no focal defects, no sensory deficits  HEENT: EOMI, no JVD, normocephalic, atraumatic, no scleral icterus, There is appreciable induration of the left lower buccal area in the back of the molar area, there is very poor dentition, with no teeth present in the bottom jaw  Cardio: RRR, S1 S2 present, no murmurs, rubs, or gallops  Resp: lungs b/l CTA, chest wall intact  Abd: soft, nondistended, nontender  MSK: no gross joint abnormalities, no obvious swelling  Skin: no rashes, no wounds  heme: no ecchymosis or petechiae

## 2024-12-19 NOTE — CONSULT NOTE ADULT - SUBJECTIVE AND OBJECTIVE BOX
Patient is a 84y old  Male who presents with a chief complaint of pain and swelling on teeth on lower left    HPI:      PAST MEDICAL & SURGICAL HISTORY:  High cholesterol      BPH (benign prostatic hyperplasia)      S/P AAA repair  2014 stent 2014      History of intravascular stent placement  2014 aaa        ( - ) heart valve replacement  ( - ) joint replacement  ( - ) pregnancy    MEDICATIONS  (STANDING):    MEDICATIONS  (PRN):      Allergies    No Known Allergies    Intolerances        FAMILY HISTORY:  No pertinent family history in first degree relatives        *SOCIAL HISTORY: (   ) Tobacco; (   ) ETOH    *Last Dental Visit:    Vital Signs Last 24 Hrs  T(C): 36.4 (19 Dec 2024 09:23), Max: 36.4 (19 Dec 2024 09:23)  T(F): 97.5 (19 Dec 2024 09:23), Max: 97.5 (19 Dec 2024 09:23)  HR: 65 (19 Dec 2024 09:23) (65 - 65)  BP: 149/80 (19 Dec 2024 09:23) (149/80 - 149/80)  BP(mean): --  RR: 17 (19 Dec 2024 09:23) (17 - 17)  SpO2: 98% (19 Dec 2024 09:23) (98% - 98%)    Parameters below as of 19 Dec 2024 09:23  Patient On (Oxygen Delivery Method): room air        LABS:                  EOE:  TMJ ( - ) clicks                     ( - ) pops                     ( - ) crepitus             Mandible <<FROM>>             Facial bones and MOM <<grossly intact>>             ( - ) trismus             ( - ) lymphadenopathy             ( + ) swelling             ( - ) asymmetry             ( - ) palpation             ( - ) dyspnea             ( - ) dysphagia             ( - ) loss of consciousness    IOE:  <<permanent>> dentition: <<multiple missing teeth>>           hard/soft palate: <<No pathology noted>>           tongue/FOM <<No pathology noted>>           labial/buccal mucosa <<No pathology noted>>           ( + ) percussion           ( + ) palpation           ( - ) swelling            ( - ) abscess           ( - ) sinus tract    Dentition present: <<y>>  Mobility: <<n>>  Caries: << y>>         *DENTAL RADIOGRAPHS: 1 periapical radiograph of #20, 21    RADIOLOGY & ADDITIONAL STUDIES:    *ASSESSMENT: Nonrestorable teeth #20, 21 reveals #20 root tip and #21 distal caries and missing tooth structure.      *PLAN: Extract teeth.    PROCEDURE:   Risks and benefits of treatment discussed as per OS sheet dated 07/13/2000. Consent signed, side/site completed. Administered 2 carpules of septocaine 1:100,000 epinephrine via local infiltration. Elevated and extracted #20 and 21 using elevator and forceps. Socket cleaned and curretted. Post-operative instructions given.    RECOMMENDATIONS:  1) Complete antiobiotic course as prescribed and analgesics as necessary for pain.  2) If any difficulty swallowing/breathing, fever occur, return to ER.     Resident Name: Malik Cabral (BRANDY), dental resident

## 2024-12-19 NOTE — ED ADULT NURSE NOTE - NSFALLRISK_ED_ALL_ED
Peter Behrens verified by name and  by mother Michelle  Date:5/3/23 Time:7:45am  Post appt: 23 AT 2:15PM  H&P appt: N/A  Prep for case created   Case request order completed   Labs/Covid test ordered   Pre op instructions created and lovia: sent via patient portal   Referral completed   Patient on a blood thinner: ALL NSAID'S, herbal supplements and vitamins to hold 1 week prior to surgical procedure.  Peter Behrens verbalized understanding of information given.      Procedure: Bilateral myringotomy and tympanostomy tube placement  CPT: 72770     Diagnosis: Bilateral middle ear effusion   Length: 20 mins         Anesthesia: General  Location: Ambulatory Surgery [facility]  Surgeon: Lisa  Assistant: None  Preop: none     
No

## 2024-12-19 NOTE — ED PROVIDER NOTE - PATIENT PORTAL LINK FT
You can access the FollowMyHealth Patient Portal offered by [facility] by registering at the following website: [email or web address]. By joining [employer]’s FollowMyHealth portal, you will also be able to view your health information using other applications (apps) compatible with our system.

## 2024-12-19 NOTE — ED PROVIDER NOTE - OBJECTIVE STATEMENT
The patient is an 84-year-old male with history of AAA,, Status post stenting, high cholesterol, who is here because of left jaw pain/buccal pain.  Patient states that he started to feel some swelling over the past couple days in his left lower jaw and then last night started to have acute pain that kept him up all night.  Patient states that he took Advil this morning at around 5 AM which did relieve his pain.  Patient Nuys any chest pain or shortness of breath, denies any history of heart attack or any other medical problems as stated prior.  Patient denies any drainage or bleeding from the mouth.  Patient denies any headaches, syncope, hearing loss, vision problems, pain with eye movement, or any other symptoms.

## 2024-12-19 NOTE — ED PROVIDER NOTE - CLINICAL SUMMARY MEDICAL DECISION MAKING FREE TEXT BOX
Patient is here because of left jaw pain consistent with dental abscess, dental was consulted to help perform abscess drainage.  EKG was obtained which shows similar findings to prior with sinus arrhythmia and frequent PVCs, heart rate 61, normal intervals, no acute ischemic changes The MDM was documented by me personally, Dr. Darien Tierney, supervising attending and serves as my attending contribution to the care of the patient. I have personally performed a history and physical exam on this patient and personally directed the management of the patient.     Patient is here because of left jaw pain consistent with dental abscess, dental was consulted to help perform abscess drainage.  EKG was obtained which shows similar findings to prior with sinus arrhythmia and frequent PVCs, heart rate 61, normal intervals, no acute ischemic changes. Pt went to dental clinic for abscess drainage. pt stable for dc with dental clinic follow up. pt will be prescribed augmentin.     Pt was stable for DC. Pt was given strict instructions and return precautions. pt was instructed to return to the ER if symptoms worsen at any time and to return to the ER at any time for any other medical concern. Pt was counseled on continuing home medications and new prescriptions.

## 2024-12-19 NOTE — ED ADULT TRIAGE NOTE - HEIGHT IN INCHES
Patient Education     Swallowed Object  Young children often put small objects, such as marbles, pins, or coins, in their mouths. These objects may then be swallowed. Although this can be frightening, it's not always cause for concern. Most often, the object will pass through your child's system without harm. But a foreign object may become stuck in the esophagus (food tube) or trachea (windpipe). In that case, your child needs prompt medical care.  When to go to the emergency room (ER)  Contact your child's doctor if you think your child has swallowed a nonfood object. Don't try to remove the object yourself. This may cause more harm. Seek emergency help if your child:  · Has trouble breathing, speaking, or swallowing.  · Is spitting up saliva or vomiting.  · Has chest pain, stomach pain, or pain when swallowing.  What to expect in the ER  · A doctor will ask about the swallowed object and perform a physical exam.  · X-rays may be taken to help locate the object, although this depends on your child's symptoms and what the object was.  · In some cases, a test called a barium swallow or  cat scan (CT) may be used. This might be done if you are not sure, but suspect an object was swallowed and it does not show up on plain X-ray. In a barium swallow, your child drinks a thick liquid and X-rays are then taken. CT scanning is a special X-ray test and may be used if your doctor suspects an abscess has formed or if there is concern about rupture of the digestive tract. This helps your doctor see objects that may not show up on other tests.  Treatment  Treatment will depend on the type of object and where it's located. Your doctor may suggest one of the following measures:  · Watchful waiting: A smooth object that has not gotten stuck may pass on its own in 24 hours.  · Esophagoscopy: To remove an object and check for any damage, an esophagoscope (a lighted, telescope-like tube) may be used. The instrument is put down into  the esophagus through the mouth. Your child will be given medication so he or she \"sleeps\" through the procedure.  Follow-up  Call your child's doctor or return to the ER if your child:  · Is nauseated or vomits.  · Has stomach pain or bloody stools.  © 0450-8238 [employer]. [address] [place] [address], [place] [address]. All rights reserved. This information is not intended as a substitute for professional medical care. Always follow your healthcare professional's instructions.            11

## 2024-12-19 NOTE — CONSULT NOTE ADULT - CONSULT REQUESTED BY NAME
Emergency department The patient underwent a RIGHT POSTERIOR TOTAL HIP REPLACEMENT on 3/9/21. The patient received antibiotics consistent with SCIP guidelines. The patient underwent the procedure and had no intra-operative complications. Post-operatively, the patient was seen by medicine and PT. The patient received ASPIRIN for DVTP. The patient received pain medications per orthopedic pain management pathway and the pain was appropriately controlled. Patient was instructed on posterior total hip precautions by PT. The patient did not have any post-operative medical complications. The patient was discharged in stable condition.

## 2024-12-19 NOTE — ED PROVIDER NOTE - OTHER FINDINGS
ekg reviewed by me personally, Dr. Darien Tierney, EKG NSR at 61, normal intervals, normal axis, sinus arrhythmia and frequent PVCs compared to prior, no acute ischemic changes, no STEMI

## 2024-12-19 NOTE — ED ADULT TRIAGE NOTE - CHIEF COMPLAINT QUOTE
Pt here with left jaw swelling/ pain last took advil ~530am.. denies fevers Pt here with left jaw  pain last took advil ~530am.. denies fevers ekg done.

## 2024-12-19 NOTE — ED PROVIDER NOTE - PROGRESS NOTE DETAILS
MS–consult placed to dental for incision and drainage of dental abscess. MS–patient returned from dental.  Had I&D completed of dental abscess and tooth pulled.  Antibiotics sent to patient's pharmacy.  Plan for discharge home.

## 2025-01-14 ENCOUNTER — APPOINTMENT (OUTPATIENT)
Dept: PAIN MANAGEMENT | Facility: CLINIC | Age: 85
End: 2025-01-14
Payer: MEDICARE

## 2025-01-14 DIAGNOSIS — B02.22 POSTHERPETIC TRIGEMINAL NEURALGIA: ICD-10-CM

## 2025-01-14 PROCEDURE — 99204 OFFICE O/P NEW MOD 45 MIN: CPT

## 2025-01-21 ENCOUNTER — APPOINTMENT (OUTPATIENT)
Dept: PAIN MANAGEMENT | Facility: CLINIC | Age: 85
End: 2025-01-21
Payer: MEDICARE

## 2025-01-21 DIAGNOSIS — M79.2 NEURALGIA AND NEURITIS, UNSPECIFIED: ICD-10-CM

## 2025-01-21 DIAGNOSIS — M48.04 SPINAL STENOSIS, THORACIC REGION: ICD-10-CM

## 2025-01-21 PROBLEM — M54.14 RADICULAR PAIN OF THORACIC REGION: Status: ACTIVE | Noted: 2025-01-21

## 2025-01-21 PROCEDURE — 99214 OFFICE O/P EST MOD 30 MIN: CPT

## 2025-01-30 ENCOUNTER — APPOINTMENT (OUTPATIENT)
Dept: PAIN MANAGEMENT | Facility: CLINIC | Age: 85
End: 2025-01-30
Payer: MEDICARE

## 2025-01-30 DIAGNOSIS — M54.14 RADICULOPATHY, THORACIC REGION: ICD-10-CM

## 2025-01-30 PROCEDURE — 62321 NJX INTERLAMINAR CRV/THRC: CPT

## 2025-02-18 ENCOUNTER — APPOINTMENT (OUTPATIENT)
Dept: PAIN MANAGEMENT | Facility: CLINIC | Age: 85
End: 2025-02-18
Payer: MEDICARE

## 2025-02-18 PROCEDURE — 99214 OFFICE O/P EST MOD 30 MIN: CPT

## 2025-02-26 ENCOUNTER — APPOINTMENT (OUTPATIENT)
Dept: PAIN MANAGEMENT | Facility: CLINIC | Age: 85
End: 2025-02-26
Payer: MEDICARE

## 2025-02-26 DIAGNOSIS — M48.04 SPINAL STENOSIS, THORACIC REGION: ICD-10-CM

## 2025-02-26 DIAGNOSIS — M54.14 RADICULOPATHY, THORACIC REGION: ICD-10-CM

## 2025-02-26 DIAGNOSIS — B02.22 POSTHERPETIC TRIGEMINAL NEURALGIA: ICD-10-CM

## 2025-02-26 PROCEDURE — 62321 NJX INTERLAMINAR CRV/THRC: CPT

## 2025-03-18 ENCOUNTER — APPOINTMENT (OUTPATIENT)
Dept: PAIN MANAGEMENT | Facility: CLINIC | Age: 85
End: 2025-03-18

## 2025-03-21 ENCOUNTER — APPOINTMENT (OUTPATIENT)
Dept: ELECTROPHYSIOLOGY | Facility: CLINIC | Age: 85
End: 2025-03-21

## 2025-03-21 ENCOUNTER — INPATIENT (INPATIENT)
Facility: HOSPITAL | Age: 85
LOS: 6 days | Discharge: HOME | DRG: 277 | End: 2025-03-28
Attending: STUDENT IN AN ORGANIZED HEALTH CARE EDUCATION/TRAINING PROGRAM | Admitting: STUDENT IN AN ORGANIZED HEALTH CARE EDUCATION/TRAINING PROGRAM
Payer: MEDICARE

## 2025-03-21 VITALS
SYSTOLIC BLOOD PRESSURE: 200 MMHG | HEIGHT: 71 IN | BODY MASS INDEX: 24.5 KG/M2 | DIASTOLIC BLOOD PRESSURE: 80 MMHG | TEMPERATURE: 98 F | WEIGHT: 175 LBS | HEART RATE: 79 BPM

## 2025-03-21 VITALS
OXYGEN SATURATION: 99 % | RESPIRATION RATE: 18 BRPM | HEART RATE: 65 BPM | HEIGHT: 71 IN | SYSTOLIC BLOOD PRESSURE: 181 MMHG | TEMPERATURE: 98 F | WEIGHT: 175.05 LBS | DIASTOLIC BLOOD PRESSURE: 74 MMHG

## 2025-03-21 DIAGNOSIS — Z95.828 PRESENCE OF OTHER VASCULAR IMPLANTS AND GRAFTS: Chronic | ICD-10-CM

## 2025-03-21 DIAGNOSIS — I48.91 UNSPECIFIED ATRIAL FIBRILLATION: ICD-10-CM

## 2025-03-21 DIAGNOSIS — I48.0 PAROXYSMAL ATRIAL FIBRILLATION: ICD-10-CM

## 2025-03-21 DIAGNOSIS — I47.20 VENTRICULAR TACHYCARDIA, UNSPECIFIED: ICD-10-CM

## 2025-03-21 LAB
ALBUMIN SERPL ELPH-MCNC: 4.6 G/DL — SIGNIFICANT CHANGE UP (ref 3.5–5.2)
ALP SERPL-CCNC: 67 U/L — SIGNIFICANT CHANGE UP (ref 30–115)
ALT FLD-CCNC: 15 U/L — SIGNIFICANT CHANGE UP (ref 0–41)
ANION GAP SERPL CALC-SCNC: 8 MMOL/L — SIGNIFICANT CHANGE UP (ref 7–14)
APTT BLD: 30.8 SEC — SIGNIFICANT CHANGE UP (ref 27–39.2)
AST SERPL-CCNC: 22 U/L — SIGNIFICANT CHANGE UP (ref 0–41)
BASOPHILS # BLD AUTO: 0.05 K/UL — SIGNIFICANT CHANGE UP (ref 0–0.2)
BASOPHILS NFR BLD AUTO: 0.7 % — SIGNIFICANT CHANGE UP (ref 0–1)
BILIRUB SERPL-MCNC: 0.6 MG/DL — SIGNIFICANT CHANGE UP (ref 0.2–1.2)
BUN SERPL-MCNC: 30 MG/DL — HIGH (ref 10–20)
CALCIUM SERPL-MCNC: 9.5 MG/DL — SIGNIFICANT CHANGE UP (ref 8.4–10.5)
CHLORIDE SERPL-SCNC: 104 MMOL/L — SIGNIFICANT CHANGE UP (ref 98–110)
CO2 SERPL-SCNC: 24 MMOL/L — SIGNIFICANT CHANGE UP (ref 17–32)
CREAT SERPL-MCNC: 1.4 MG/DL — SIGNIFICANT CHANGE UP (ref 0.7–1.5)
EGFR: 50 ML/MIN/1.73M2 — LOW
EGFR: 50 ML/MIN/1.73M2 — LOW
EOSINOPHIL # BLD AUTO: 0.07 K/UL — SIGNIFICANT CHANGE UP (ref 0–0.7)
EOSINOPHIL NFR BLD AUTO: 1 % — SIGNIFICANT CHANGE UP (ref 0–8)
GLUCOSE SERPL-MCNC: 86 MG/DL — SIGNIFICANT CHANGE UP (ref 70–99)
HCT VFR BLD CALC: 44.7 % — SIGNIFICANT CHANGE UP (ref 42–52)
HGB BLD-MCNC: 15.3 G/DL — SIGNIFICANT CHANGE UP (ref 14–18)
IMM GRANULOCYTES NFR BLD AUTO: 0.9 % — HIGH (ref 0.1–0.3)
INR BLD: 1.05 RATIO — SIGNIFICANT CHANGE UP (ref 0.65–1.3)
LYMPHOCYTES # BLD AUTO: 1.43 K/UL — SIGNIFICANT CHANGE UP (ref 1.2–3.4)
LYMPHOCYTES # BLD AUTO: 20.9 % — SIGNIFICANT CHANGE UP (ref 20.5–51.1)
MAGNESIUM SERPL-MCNC: 2.1 MG/DL — SIGNIFICANT CHANGE UP (ref 1.8–2.4)
MAGNESIUM SERPL-MCNC: 2.3 MG/DL — SIGNIFICANT CHANGE UP (ref 1.8–2.4)
MCHC RBC-ENTMCNC: 31.1 PG — HIGH (ref 27–31)
MCHC RBC-ENTMCNC: 34.2 G/DL — SIGNIFICANT CHANGE UP (ref 32–37)
MCV RBC AUTO: 90.9 FL — SIGNIFICANT CHANGE UP (ref 80–94)
MONOCYTES # BLD AUTO: 0.56 K/UL — SIGNIFICANT CHANGE UP (ref 0.1–0.6)
MONOCYTES NFR BLD AUTO: 8.2 % — SIGNIFICANT CHANGE UP (ref 1.7–9.3)
NEUTROPHILS # BLD AUTO: 4.68 K/UL — SIGNIFICANT CHANGE UP (ref 1.4–6.5)
NEUTROPHILS NFR BLD AUTO: 68.3 % — SIGNIFICANT CHANGE UP (ref 42.2–75.2)
NRBC BLD AUTO-RTO: 0 /100 WBCS — SIGNIFICANT CHANGE UP (ref 0–0)
PLATELET # BLD AUTO: 133 K/UL — SIGNIFICANT CHANGE UP (ref 130–400)
PMV BLD: 10.8 FL — HIGH (ref 7.4–10.4)
POTASSIUM SERPL-MCNC: 4.9 MMOL/L — SIGNIFICANT CHANGE UP (ref 3.5–5)
POTASSIUM SERPL-MCNC: 5.7 MMOL/L — HIGH (ref 3.5–5)
POTASSIUM SERPL-SCNC: 4.9 MMOL/L — SIGNIFICANT CHANGE UP (ref 3.5–5)
POTASSIUM SERPL-SCNC: 5.7 MMOL/L — HIGH (ref 3.5–5)
PROT SERPL-MCNC: 6.8 G/DL — SIGNIFICANT CHANGE UP (ref 6–8)
PROTHROM AB SERPL-ACNC: 12.4 SEC — SIGNIFICANT CHANGE UP (ref 9.95–12.87)
RBC # BLD: 4.92 M/UL — SIGNIFICANT CHANGE UP (ref 4.7–6.1)
RBC # FLD: 12.7 % — SIGNIFICANT CHANGE UP (ref 11.5–14.5)
SODIUM SERPL-SCNC: 136 MMOL/L — SIGNIFICANT CHANGE UP (ref 135–146)
TROPONIN T, HIGH SENSITIVITY RESULT: 12 NG/L — SIGNIFICANT CHANGE UP (ref 6–21)
WBC # BLD: 6.85 K/UL — SIGNIFICANT CHANGE UP (ref 4.8–10.8)
WBC # FLD AUTO: 6.85 K/UL — SIGNIFICANT CHANGE UP (ref 4.8–10.8)

## 2025-03-21 PROCEDURE — 85730 THROMBOPLASTIN TIME PARTIAL: CPT

## 2025-03-21 PROCEDURE — 93458 L HRT ARTERY/VENTRICLE ANGIO: CPT

## 2025-03-21 PROCEDURE — 86850 RBC ANTIBODY SCREEN: CPT

## 2025-03-21 PROCEDURE — 83036 HEMOGLOBIN GLYCOSYLATED A1C: CPT

## 2025-03-21 PROCEDURE — 36415 COLL VENOUS BLD VENIPUNCTURE: CPT

## 2025-03-21 PROCEDURE — C1894: CPT

## 2025-03-21 PROCEDURE — 84484 ASSAY OF TROPONIN QUANT: CPT

## 2025-03-21 PROCEDURE — 99285 EMERGENCY DEPT VISIT HI MDM: CPT | Mod: FS

## 2025-03-21 PROCEDURE — 84132 ASSAY OF SERUM POTASSIUM: CPT

## 2025-03-21 PROCEDURE — C1892: CPT

## 2025-03-21 PROCEDURE — 93306 TTE W/DOPPLER COMPLETE: CPT

## 2025-03-21 PROCEDURE — C1769: CPT

## 2025-03-21 PROCEDURE — A9579: CPT

## 2025-03-21 PROCEDURE — C1777: CPT

## 2025-03-21 PROCEDURE — 93005 ELECTROCARDIOGRAM TRACING: CPT

## 2025-03-21 PROCEDURE — 93010 ELECTROCARDIOGRAM REPORT: CPT | Mod: 77

## 2025-03-21 PROCEDURE — 80061 LIPID PANEL: CPT

## 2025-03-21 PROCEDURE — C1889: CPT

## 2025-03-21 PROCEDURE — 86901 BLOOD TYPING SEROLOGIC RH(D): CPT

## 2025-03-21 PROCEDURE — 84443 ASSAY THYROID STIM HORMONE: CPT

## 2025-03-21 PROCEDURE — 75561 CARDIAC MRI FOR MORPH W/DYE: CPT

## 2025-03-21 PROCEDURE — G2211 COMPLEX E/M VISIT ADD ON: CPT

## 2025-03-21 PROCEDURE — 99204 OFFICE O/P NEW MOD 45 MIN: CPT

## 2025-03-21 PROCEDURE — 71045 X-RAY EXAM CHEST 1 VIEW: CPT

## 2025-03-21 PROCEDURE — 71045 X-RAY EXAM CHEST 1 VIEW: CPT | Mod: 26

## 2025-03-21 PROCEDURE — 33249 INSJ/RPLCMT DEFIB W/LEAD(S): CPT

## 2025-03-21 PROCEDURE — C1721: CPT

## 2025-03-21 PROCEDURE — 83735 ASSAY OF MAGNESIUM: CPT

## 2025-03-21 PROCEDURE — 93000 ELECTROCARDIOGRAM COMPLETE: CPT

## 2025-03-21 PROCEDURE — 71046 X-RAY EXAM CHEST 2 VIEWS: CPT

## 2025-03-21 PROCEDURE — C1887: CPT

## 2025-03-21 PROCEDURE — 86900 BLOOD TYPING SEROLOGIC ABO: CPT

## 2025-03-21 PROCEDURE — 85027 COMPLETE CBC AUTOMATED: CPT

## 2025-03-21 PROCEDURE — C1898: CPT

## 2025-03-21 PROCEDURE — 93287 PERI-PX DEVICE EVAL & PRGR: CPT

## 2025-03-21 PROCEDURE — 85025 COMPLETE CBC W/AUTO DIFF WBC: CPT

## 2025-03-21 PROCEDURE — 80048 BASIC METABOLIC PNL TOTAL CA: CPT

## 2025-03-21 PROCEDURE — 85610 PROTHROMBIN TIME: CPT

## 2025-03-21 RX ORDER — LOSARTAN POTASSIUM 25 MG/1
25 TABLET, FILM COATED ORAL
Qty: 90 | Refills: 3 | Status: ACTIVE | COMMUNITY

## 2025-03-21 RX ORDER — TAMSULOSIN HYDROCHLORIDE 0.4 MG/1
0.4 CAPSULE ORAL
Qty: 30 | Refills: 2 | Status: ACTIVE | COMMUNITY

## 2025-03-21 RX ORDER — APIXABAN 2.5 MG/1
2.5 TABLET, FILM COATED ORAL
Qty: 180 | Refills: 3 | Status: ACTIVE | COMMUNITY

## 2025-03-21 RX ORDER — TAMSULOSIN HYDROCHLORIDE 0.4 MG/1
0.4 CAPSULE ORAL AT BEDTIME
Refills: 0 | Status: DISCONTINUED | OUTPATIENT
Start: 2025-03-21 | End: 2025-03-28

## 2025-03-21 RX ORDER — ATORVASTATIN CALCIUM 80 MG/1
10 TABLET, FILM COATED ORAL AT BEDTIME
Refills: 0 | Status: DISCONTINUED | OUTPATIENT
Start: 2025-03-21 | End: 2025-03-28

## 2025-03-21 RX ORDER — ENOXAPARIN SODIUM 100 MG/ML
80 INJECTION SUBCUTANEOUS EVERY 12 HOURS
Refills: 0 | Status: DISCONTINUED | OUTPATIENT
Start: 2025-03-21 | End: 2025-03-24

## 2025-03-21 RX ORDER — APIXABAN 2.5 MG/1
2.5 TABLET, FILM COATED ORAL ONCE
Refills: 0 | Status: COMPLETED | OUTPATIENT
Start: 2025-03-21 | End: 2025-03-21

## 2025-03-21 RX ORDER — AMLODIPINE BESYLATE 10 MG/1
5 TABLET ORAL ONCE
Refills: 0 | Status: COMPLETED | OUTPATIENT
Start: 2025-03-21 | End: 2025-03-21

## 2025-03-21 RX ADMIN — AMLODIPINE BESYLATE 5 MILLIGRAM(S): 10 TABLET ORAL at 23:56

## 2025-03-21 RX ADMIN — APIXABAN 2.5 MILLIGRAM(S): 2.5 TABLET, FILM COATED ORAL at 21:02

## 2025-03-21 RX ADMIN — Medication 10 MILLIGRAM(S): at 23:56

## 2025-03-21 NOTE — ED ADULT NURSE NOTE - EXTENSIONS OF SELF_ADULT
Phone call to pt  LM on voice mail  Needing more info from pt  [facility] orders were faxed last week after pt's last appointment  Not sure what "new order" is needed  Pt instructed to return call  None

## 2025-03-21 NOTE — ED PROVIDER NOTE - OBJECTIVE STATEMENT
84-year-old male past medical history of BPH, hyperlipidemia, AAA repair, recent diagnosis of A-fib on Eliquis presents for evaluation.  Patient states he had Holter monitor placed the beginning of the month by his PMD Dr. Olmedo and was told that he had A-fib and was started on Eliquis and told to follow-up with EP Dr. Bass.  Patient saw Dr. Monson today was instructed to come to the ED for multiple episodes of A-fib, bigeminy and sinus pause.  Patient endorses intermittent episodes of lightheadedness sporadically for the past couple months.  No inciting relieving factors.  Denies fever, headache, chest pain, shortness of breath, abdominal pain, dysuria, hematuria, vomiting, diarrhea.

## 2025-03-21 NOTE — H&P ADULT - NSHPPHYSICALEXAM_GEN_ALL_CORE
GENERAL: NAD, lying in bed comfortably  HEAD:  Atraumatic, normocephalic  EYES: EOMI, PERRL  NECK: Supple, trachea midline, no JVD  HEART: iregular rate and rhythm  LUNGS: Unlabored respirations.  Clear to auscultation bilaterally, no crackles, wheezing, or rhonchi  ABDOMEN: Soft, nontender, nondistended, +BS  EXTREMITIES: 2+ peripheral pulses bilaterally. No clubbing, cyanosis, or edema  NERVOUS SYSTEM:  A&Ox3, moving all extremities, no focal deficits   SKIN: intact

## 2025-03-21 NOTE — ED PROVIDER NOTE - ATTENDING APP SHARED VISIT CONTRIBUTION OF CARE
85 yo m with pmh of aaa s/p repair, 85 yo m with pmh of aaa s/p repair, afib on eliquis, bph, sent from dr. portillo for runs of vtach on holter monitor.  pt admits was having episodes of dizziness/lightheadedness for a few months.  his pmd found him in afib so started on eliquis and went to dr. portillo for further. exam: nad, ncat, perrl, eomi, mmm, rrr, ctab, abd soft, nt, nd aox3, no calf tenderness, no pitting edema imp: pt with runs of multiple types of arrhythmia, afib/svt/pvc/vtach, sent for admission and EP eval.

## 2025-03-21 NOTE — H&P ADULT - NSHPLABSRESULTS_GEN_ALL_CORE
15.3   6.85  )-----------( x        ( 21 Mar 2025 19:54 )             44.7       03-21    136  |  104  |  30[H]  ----------------------------<  86  5.7[H]   |  24  |  1.4    Ca    9.5      21 Mar 2025 19:54  Mg     2.3     03-21    TPro  6.8  /  Alb  4.6  /  TBili  0.6  /  DBili  x   /  AST  22  /  ALT  15  /  AlkPhos  67  03-21

## 2025-03-21 NOTE — PATIENT PROFILE ADULT - STATED REASON FOR ADMISSION
Patient went to see primary MD who placed him on a monitor, and the results of the monitor was not what the MD liked so they recommended he come to the hospial

## 2025-03-21 NOTE — H&P ADULT - HISTORY OF PRESENT ILLNESS
84 yr old male with hx of pAFib (on eliquis), PVC, HTN, HLD, s/p AAA repair, BPH was sent by Dr. Bass for sustained VT on MCOT. Patient states he had Holter monitor placed the beginning of the month by his PMD Dr. Olmedo and was told that he had A-fib and was started on Eliquis and told to follow-up with EP Dr. Bass.  Patient saw Dr. Monson today was instructed to come to the ED for multiple episodes of A-fib, bigeminy and sustained VT. Patient also endorsed intermittent palpiation associated with lighthesdedness. He currently he does not have any complains. Denies any chest pain or sob. Patient is fully funtional and able to carry out is ADLS.  84 yr old male with hx of pAFib (on Eliquis PVC, HTN, HLD, s/p AAA repair, BPH was sent by Dr. Bass for sustained VT on MCOT. Patient states he had Holter monitor placed the beginning of the month by his PMD Dr. Olmedo and was told that he had A-fib and was started on Eliquis and told to follow-up with EP Dr. Bass.  Patient saw Dr. Bass today was instructed to come to the ED for multiple episodes of A-fib, bigeminy and sustained VT. Patient also endorsed intermittent lightheadedness. He currently he does not have any complains. Denies any palpitation chest pain or sob. Patient is fully functional and able to carry out is ADLS.  84 yr old male with hx of pAFib (on Eliquis PVC, HTN, HLD, s/p AAA repair, BPH was sent by Dr. Bass for sustained VT on MCOT. Patient states he had Holter monitor placed the beginning of the month by his PMD Dr. Olmedo and was told that he had A-fib and was started on Eliquis.  Patient saw Dr. Bass today was instructed to come to the ED for multiple episodes of A-fib, bigeminy and sustained VT. Patient also endorsed intermittent lightheadedness. He currently he does not have any complains. Denies any palpitation chest pain or sob. Patient is fully functional and able to carry out is ADLS.

## 2025-03-21 NOTE — ED PROVIDER NOTE - CLINICAL SUMMARY MEDICAL DECISION MAKING FREE TEXT BOX
pt with runs of multiple types of arrhythmia, afib/svt/pvc/vtach, sent for admission and EP eval.  Any ordered labs and EKG were reviewed, Dr. Mitzi Zapien, attending physician.  Any imaging was ordered and reviewed by me, Dr. Mitzi Zapien, attending physician.  Appropriate medications for patient's presenting complaints were ordered and effects were reassessed.  Patient's records, if available,  (prior hospital, ED visit, and/or nursing home notes if available) were reviewed.  Additional history was obtained from EMS, family, and/or PCP (when available).  Escalation to admission/observation was considered. Patient requires inpatient hospitalization - monitored setting.

## 2025-03-21 NOTE — ED ADULT TRIAGE NOTE - CHIEF COMPLAINT QUOTE
Pt. BIBA for abnormal EKG. Pt. was at the cardiologist today and was told that he had runs of v-tach on his halter monitor reading on 03/06/25. Pt. denies chest pain. Pt. denies SOB. Pt. has paroxymal a-fib and was recently started on Eliquis.

## 2025-03-21 NOTE — PATIENT PROFILE ADULT - FALL HARM RISK - UNIVERSAL INTERVENTIONS
Bed in lowest position, wheels locked, appropriate side rails in place/Call bell, personal items and telephone in reach/Instruct patient to call for assistance before getting out of bed or chair/Non-slip footwear when patient is out of bed/[place] to call system/Physically safe environment - no spills, clutter or unnecessary equipment/Purposeful Proactive Rounding/Room/bathroom lighting operational, light cord in reach

## 2025-03-21 NOTE — H&P ADULT - ASSESSMENT
84 yr old male with hx of pAFib (on eliquis), PVC, HTN, HLD, s/p AAA repair admitted for arrhythmia.      # Sustained VT on MCOT   # pAfib   # HTN / DLD  # CKD  # s/p AAA repair   - hemodyanmically stable, euvolumic on exam   - Cr 1.4 (at baseline); eGFR 50  - Trop: 12  - EKG: NSR with PVC  - CXR: no acute cardiopul disease   - start   - d/c eliquis and start therapeutic lovenox    - c/w home   - check TSH, A1C and lipid panel  - check TTE  - check CMR   - plan for cath on monday   - DASH diet   - fall precaution   - ambulate as tolerated   - tele monitoring  84 yr old male with hx of pAFib (on eliquis), PVC, HTN, HLD, s/p AAA repair admitted for arrhythmia.      # Sustained VT on MCOT   # pAfib   # Hyperkalemia   # HTN / DLD  # CKD  # s/p AAA repair   - hemodynamically stable, euvolemic on exam   - Cr 1.4 (at baseline); eGFR 50  - Trop: 12  - EKG: NSR with PVC  - CXR: no acute cardiopul disease   - d/c Eliquis and start therapeutic Lovenox    - c/w home Atorvastatin and Flomax   - hold home losartan for hyperkalemia --> start amlodipine 5 mg daily   - check TSH, A1C and lipid panel  - check TTE  - check CMR   - plan for cath on Monday   - DASH diet   - fall precaution   - ambulate as tolerated   - tele monitoring  84 yr old male with hx of pAFib (on eliquis), PVC, HTN, HLD, s/p AAA repair admitted for arrhythmia.      # Sustained VT on MCOT   # pAfib   # Hyperkalemia   # HTN / DLD  # CKD  # s/p AAA repair   - hemodynamically stable, euvolemic on exam   - Cr 1.4 (at baseline); eGFR 50  - Trop: 12  - EKG: NSR with PVC  - CXR: no acute cardiopul disease   - d/c Eliquis and start therapeutic Lovenox    - c/w home Atorvastatin and Flomax   - HR in the 50s --> will start metoprolol 12.5 q12   - hold home losartan for hyperkalemia --> if bp high consider Carvedilol / amlodipine   - check TSH, A1C and lipid panel  - check TTE  - check CMR   - plan for cath on Monday   - DASH diet   - fall precaution   - ambulate as tolerated   - tele monitoring  84 yr old male with hx of pAFib (on eliquis), PVC, HTN, HLD, s/p AAA repair admitted for arrhythmia.      # Sustained VT on MCOT   # pAfib   # Hyperkalemia   # HTN / DLD  # CKD  # s/p AAA repair   - hemodynamically stable, euvolemic on exam   - Cr 1.4 (at baseline); eGFR 50  - Trop: 12  - EKG: NSR with PVC  - CXR: no acute cardiopul disease   - d/c Eliquis and start therapeutic Lovenox    - c/w home Atorvastatin and Flomax   - hold home losartan for hyperkalemia --> start Carvedilol   - check TSH, A1C and lipid panel  - check TTE  - check CMR   - plan for cath on Monday   - DASH diet   - fall precaution   - ambulate as tolerated   - tele monitoring

## 2025-03-21 NOTE — ED ADULT NURSE NOTE - NSFALLUNIVINTERV_ED_ALL_ED
Bed/Stretcher in lowest position, wheels locked, appropriate side rails in place/Call bell, personal items and telephone in reach/Instruct patient to call for assistance before getting out of bed/chair/stretcher/Non-slip footwear applied when patient is off stretcher/[place] to call system/Physically safe environment - no spills, clutter or unnecessary equipment/Purposeful proactive rounding/Room/bathroom lighting operational, light cord in reach

## 2025-03-22 ENCOUNTER — RESULT REVIEW (OUTPATIENT)
Age: 85
End: 2025-03-22

## 2025-03-22 LAB
A1C WITH ESTIMATED AVERAGE GLUCOSE RESULT: 5.5 % — SIGNIFICANT CHANGE UP (ref 4–5.6)
ANION GAP SERPL CALC-SCNC: 6 MMOL/L — LOW (ref 7–14)
BASOPHILS # BLD AUTO: 0.06 K/UL — SIGNIFICANT CHANGE UP (ref 0–0.2)
BASOPHILS NFR BLD AUTO: 0.9 % — SIGNIFICANT CHANGE UP (ref 0–1)
BUN SERPL-MCNC: 28 MG/DL — HIGH (ref 10–20)
CALCIUM SERPL-MCNC: 9.5 MG/DL — SIGNIFICANT CHANGE UP (ref 8.4–10.5)
CHLORIDE SERPL-SCNC: 101 MMOL/L — SIGNIFICANT CHANGE UP (ref 98–110)
CHOLEST SERPL-MCNC: 133 MG/DL — SIGNIFICANT CHANGE UP
CO2 SERPL-SCNC: 28 MMOL/L — SIGNIFICANT CHANGE UP (ref 17–32)
CREAT SERPL-MCNC: 1.2 MG/DL — SIGNIFICANT CHANGE UP (ref 0.7–1.5)
EGFR: 60 ML/MIN/1.73M2 — SIGNIFICANT CHANGE UP
EGFR: 60 ML/MIN/1.73M2 — SIGNIFICANT CHANGE UP
EOSINOPHIL # BLD AUTO: 0.13 K/UL — SIGNIFICANT CHANGE UP (ref 0–0.7)
EOSINOPHIL NFR BLD AUTO: 1.9 % — SIGNIFICANT CHANGE UP (ref 0–8)
ESTIMATED AVERAGE GLUCOSE: 111 MG/DL — SIGNIFICANT CHANGE UP (ref 68–114)
GLUCOSE SERPL-MCNC: 85 MG/DL — SIGNIFICANT CHANGE UP (ref 70–99)
HCT VFR BLD CALC: 44.3 % — SIGNIFICANT CHANGE UP (ref 42–52)
HDLC SERPL-MCNC: 38 MG/DL — LOW
HGB BLD-MCNC: 15.2 G/DL — SIGNIFICANT CHANGE UP (ref 14–18)
IMM GRANULOCYTES NFR BLD AUTO: 0.6 % — HIGH (ref 0.1–0.3)
LIPID PNL WITH DIRECT LDL SERPL: 69 MG/DL — SIGNIFICANT CHANGE UP
LYMPHOCYTES # BLD AUTO: 1.77 K/UL — SIGNIFICANT CHANGE UP (ref 1.2–3.4)
LYMPHOCYTES # BLD AUTO: 26.5 % — SIGNIFICANT CHANGE UP (ref 20.5–51.1)
MCHC RBC-ENTMCNC: 31.5 PG — HIGH (ref 27–31)
MCHC RBC-ENTMCNC: 34.3 G/DL — SIGNIFICANT CHANGE UP (ref 32–37)
MCV RBC AUTO: 91.7 FL — SIGNIFICANT CHANGE UP (ref 80–94)
MONOCYTES # BLD AUTO: 0.68 K/UL — HIGH (ref 0.1–0.6)
MONOCYTES NFR BLD AUTO: 10.2 % — HIGH (ref 1.7–9.3)
NEUTROPHILS # BLD AUTO: 4 K/UL — SIGNIFICANT CHANGE UP (ref 1.4–6.5)
NEUTROPHILS NFR BLD AUTO: 59.9 % — SIGNIFICANT CHANGE UP (ref 42.2–75.2)
NON HDL CHOLESTEROL: 95 MG/DL — SIGNIFICANT CHANGE UP
NRBC BLD AUTO-RTO: 0 /100 WBCS — SIGNIFICANT CHANGE UP (ref 0–0)
PLATELET # BLD AUTO: 126 K/UL — LOW (ref 130–400)
PMV BLD: 10.9 FL — HIGH (ref 7.4–10.4)
POTASSIUM SERPL-MCNC: 5 MMOL/L — SIGNIFICANT CHANGE UP (ref 3.5–5)
POTASSIUM SERPL-SCNC: 5 MMOL/L — SIGNIFICANT CHANGE UP (ref 3.5–5)
RBC # BLD: 4.83 M/UL — SIGNIFICANT CHANGE UP (ref 4.7–6.1)
RBC # FLD: 12.9 % — SIGNIFICANT CHANGE UP (ref 11.5–14.5)
SODIUM SERPL-SCNC: 135 MMOL/L — SIGNIFICANT CHANGE UP (ref 135–146)
TRIGL SERPL-MCNC: 148 MG/DL — SIGNIFICANT CHANGE UP
TROPONIN T, HIGH SENSITIVITY RESULT: 18 NG/L — SIGNIFICANT CHANGE UP (ref 6–21)
TSH SERPL-MCNC: 1.46 UIU/ML — SIGNIFICANT CHANGE UP (ref 0.27–4.2)
WBC # BLD: 6.68 K/UL — SIGNIFICANT CHANGE UP (ref 4.8–10.8)
WBC # FLD AUTO: 6.68 K/UL — SIGNIFICANT CHANGE UP (ref 4.8–10.8)

## 2025-03-22 PROCEDURE — 99223 1ST HOSP IP/OBS HIGH 75: CPT

## 2025-03-22 PROCEDURE — 99223 1ST HOSP IP/OBS HIGH 75: CPT | Mod: FS

## 2025-03-22 PROCEDURE — 93306 TTE W/DOPPLER COMPLETE: CPT | Mod: 26

## 2025-03-22 RX ORDER — CARVEDILOL 3.12 MG/1
3.12 TABLET, FILM COATED ORAL EVERY 12 HOURS
Refills: 0 | Status: DISCONTINUED | OUTPATIENT
Start: 2025-03-22 | End: 2025-03-22

## 2025-03-22 RX ORDER — AMLODIPINE BESYLATE 10 MG/1
5 TABLET ORAL DAILY
Refills: 0 | Status: DISCONTINUED | OUTPATIENT
Start: 2025-03-22 | End: 2025-03-22

## 2025-03-22 RX ORDER — METOPROLOL SUCCINATE 50 MG/1
12.5 TABLET, EXTENDED RELEASE ORAL EVERY 12 HOURS
Refills: 0 | Status: DISCONTINUED | OUTPATIENT
Start: 2025-03-22 | End: 2025-03-22

## 2025-03-22 RX ORDER — METOPROLOL SUCCINATE 50 MG/1
12.5 TABLET, EXTENDED RELEASE ORAL EVERY 12 HOURS
Refills: 0 | Status: DISCONTINUED | OUTPATIENT
Start: 2025-03-22 | End: 2025-03-25

## 2025-03-22 RX ORDER — ACETAMINOPHEN 500 MG/5ML
650 LIQUID (ML) ORAL EVERY 6 HOURS
Refills: 0 | Status: DISCONTINUED | OUTPATIENT
Start: 2025-03-22 | End: 2025-03-28

## 2025-03-22 RX ORDER — LOSARTAN POTASSIUM 100 MG/1
25 TABLET, FILM COATED ORAL DAILY
Refills: 0 | Status: DISCONTINUED | OUTPATIENT
Start: 2025-03-22 | End: 2025-03-28

## 2025-03-22 RX ADMIN — ATORVASTATIN CALCIUM 10 MILLIGRAM(S): 80 TABLET, FILM COATED ORAL at 21:30

## 2025-03-22 RX ADMIN — ENOXAPARIN SODIUM 80 MILLIGRAM(S): 100 INJECTION SUBCUTANEOUS at 18:48

## 2025-03-22 RX ADMIN — Medication 650 MILLIGRAM(S): at 16:21

## 2025-03-22 RX ADMIN — Medication 650 MILLIGRAM(S): at 15:57

## 2025-03-22 RX ADMIN — TAMSULOSIN HYDROCHLORIDE 0.4 MILLIGRAM(S): 0.4 CAPSULE ORAL at 21:30

## 2025-03-22 RX ADMIN — CARVEDILOL 3.12 MILLIGRAM(S): 3.12 TABLET, FILM COATED ORAL at 07:52

## 2025-03-22 RX ADMIN — METOPROLOL SUCCINATE 12.5 MILLIGRAM(S): 50 TABLET, EXTENDED RELEASE ORAL at 18:46

## 2025-03-22 RX ADMIN — Medication 1 APPLICATION(S): at 05:53

## 2025-03-22 RX ADMIN — ENOXAPARIN SODIUM 80 MILLIGRAM(S): 100 INJECTION SUBCUTANEOUS at 05:53

## 2025-03-22 NOTE — PROGRESS NOTE ADULT - SUBJECTIVE AND OBJECTIVE BOX
Chief complaint: Patient is a 84y old  Male who presents with a chief complaint of Sustained VT (21 Mar 2025 20:40)    Interval history:    Review of systems: A complete 10-point review of systems was obtained and is negative except as stated in the interval history.    Vitals:  T(F): 97.7, Max: 98.1 (03-21 @ 18:27)  HR: 64 (64 - 80)  BP: 148/70 (120/63 - 212/102)  RR: 18 (18 - 20)  SpO2: 90% (90% - 99%)    Ins & outs:     Weight trend:  Weight (kg): 76.5 (03-21)    Physical exam:  General: No apparent distress  HEENT: Anicteric sclera. Moist mucous membranes. JVP *** cm.   Cardiac: Regular rate and rhythm. No murmurs, rubs, or gallops.   Vascular: Symmetric radial pulses. Dorsalis pedis pulses palpable.   Respiratory: Normal effort. Bibasilar crackles. Clear to ascultation.   Abdomen: Soft, nontender. Audible bowel sounds.   Extremities: Warm with *** edema. No cyanosis or clubbing.   Skin: Warm and dry. No rash.   Neurologic: Grossly normal motor function.   Psychiatric: Oriented to person, place, and time.     Data reviewed:  - Telemetry:   - ECG (date***):   - TTE (date***):   - Chest x-ray (date***):   - Stress test:   - CCTA:  - Cardiac catheterization:  - Cardiac MRI:    - Labs:                        15.2   6.68  )-----------( 126      ( 22 Mar 2025 05:43 )             44.3     03-22    135  |  101  |  28[H]  ----------------------------<  85  5.0   |  28  |  1.2    Ca    9.5      22 Mar 2025 05:43  Mg     2.1     03-21    TPro  6.8  /  Alb  4.6  /  TBili  0.6  /  DBili  x   /  AST  22  /  ALT  15  /  AlkPhos  67  03-21    PT/INR - ( 21 Mar 2025 19:54 )   PT: 12.40 sec;   INR: 1.05 ratio         PTT - ( 21 Mar 2025 19:54 )  PTT:30.8 sec        Triglycerides, Serum: 148 mg/dL (03-22-25 @ 05:43)  LDL Cholesterol Calculated: 69 mg/dL (03-22-25 @ 05:43)      Urinalysis Basic - ( 22 Mar 2025 05:43 )    Color: x / Appearance: x / SG: x / pH: x  Gluc: 85 mg/dL / Ketone: x  / Bili: x / Urobili: x   Blood: x / Protein: x / Nitrite: x   Leuk Esterase: x / RBC: x / WBC x   Sq Epi: x / Non Sq Epi: x / Bacteria: x        Medications:  atorvastatin 10 milliGRAM(s) Oral at bedtime  carvedilol 3.125 milliGRAM(s) Oral every 12 hours  chlorhexidine 2% Cloths 1 Application(s) Topical <User Schedule>  enoxaparin Injectable 80 milliGRAM(s) SubCutaneous every 12 hours  tamsulosin 0.4 milliGRAM(s) Oral at bedtime    Drips:    PRN:     Allergies    No Known Allergies    Intolerances      Assessment:      Problems discussed and associated plan:      Please contact me with any questions or concerns at [phone]. Chief complaint: Patient is a 84y old  Male who presents with a chief complaint of Sustained VT (21 Mar 2025 20:40)    Interval history: patient was seen and examined at the bedside this am. denies any chest pain dizziness palpitations or sob.     Review of systems: A complete 10-point review of systems was obtained and is negative except as stated in the interval history.    Vitals:  T(F): 97.7, Max: 98.1 (03-21 @ 18:27)  HR: 64 (64 - 80)  BP: 148/70 (120/63 - 212/102)  RR: 18 (18 - 20)  SpO2: 90% (90% - 99%)    Ins & outs:     Weight trend:  Weight (kg): 76.5 (03-21)    Physical exam:  General: No apparent distress  HEENT: Anicteric sclera. Moist mucous membranes. NO JVD  Cardiac: Regular rate and rhythm. No murmurs, rubs, or gallops.   Vascular: Symmetric radial pulses. Dorsalis pedis pulses palpable.   Respiratory: Normal effort. Bibasilar crackles. Clear to ascultation.   Abdomen: Soft, nontender. Audible bowel sounds.   Extremities: Warm with NO edema. No cyanosis or clubbing.  pulses + 2 b/l DP PT and popliteal.   Skin: Warm and dry. No rash.    Neurologic: Grossly normal motor function. CV 2-12 intact.   Psychiatric: Oriented to person, place, and time.     Data reviewed:  - Telemetry:   - ECG (date***):   - TTE (date***):   - Chest x-ray (date***):   - Stress test:   - CCTA:  - Cardiac catheterization:  - Cardiac MRI:    - Labs:                        15.2   6.68  )-----------( 126      ( 22 Mar 2025 05:43 )             44.3     03-22    135  |  101  |  28[H]  ----------------------------<  85  5.0   |  28  |  1.2    Ca    9.5      22 Mar 2025 05:43  Mg     2.1     03-21    TPro  6.8  /  Alb  4.6  /  TBili  0.6  /  DBili  x   /  AST  22  /  ALT  15  /  AlkPhos  67  03-21    PT/INR - ( 21 Mar 2025 19:54 )   PT: 12.40 sec;   INR: 1.05 ratio         PTT - ( 21 Mar 2025 19:54 )  PTT:30.8 sec        Triglycerides, Serum: 148 mg/dL (03-22-25 @ 05:43)  LDL Cholesterol Calculated: 69 mg/dL (03-22-25 @ 05:43)      Urinalysis Basic - ( 22 Mar 2025 05:43 )    Color: x / Appearance: x / SG: x / pH: x  Gluc: 85 mg/dL / Ketone: x  / Bili: x / Urobili: x   Blood: x / Protein: x / Nitrite: x   Leuk Esterase: x / RBC: x / WBC x   Sq Epi: x / Non Sq Epi: x / Bacteria: x        Medications:  atorvastatin 10 milliGRAM(s) Oral at bedtime  carvedilol 3.125 milliGRAM(s) Oral every 12 hours  chlorhexidine 2% Cloths 1 Application(s) Topical <User Schedule>  enoxaparin Injectable 80 milliGRAM(s) SubCutaneous every 12 hours  tamsulosin 0.4 milliGRAM(s) Oral at bedtime        Allergies    No Known Allergies        Please contact me with any questions or concerns at [phone]. Chief complaint: Patient is a 84y old  Male who presents with a chief complaint of Sustained VT (21 Mar 2025 20:40)    Interval history: patient was seen and examined at the bedside this am. denies any chest pain dizziness palpitations or sob.     Review of systems: A complete 10-point review of systems was obtained and is negative except as stated in the interval history.    Vitals:  T(F): 97.7, Max: 98.1 (03-21 @ 18:27)  HR: 64 (64 - 80)  BP: 148/70 (120/63 - 212/102)  RR: 18 (18 - 20)  SpO2: 90% (90% - 99%)    Ins & outs:     Weight (kg): 76.5 (03-21)    Physical exam:  General: No apparent distress  HEENT: Anicteric sclera. Moist mucous membranes. NO JVD  Cardiac: Regular rate and rhythm. No murmurs, rubs, or gallops.   Vascular: Symmetric radial pulses. Dorsalis pedis pulses palpable.   Respiratory: Normal effort. Bibasilar crackles. Clear to ascultation.   Abdomen: Soft, nontender. Audible bowel sounds.   Extremities: Warm with NO edema. No cyanosis or clubbing.  pulses + 2 b/l DP PT and popliteal.   Skin: Warm and dry. No rash.    Neurologic: Grossly normal motor function. CV 2-12 intact.   Psychiatric: Oriented to person, place, and time.     Data reviewed:      - Labs:                        15.2   6.68  )-----------( 126      ( 22 Mar 2025 05:43 )             44.3     03-22    135  |  101  |  28[H]  ----------------------------<  85  5.0   |  28  |  1.2    Ca    9.5      22 Mar 2025 05:43  Mg     2.1     03-21    TPro  6.8  /  Alb  4.6  /  TBili  0.6  /  DBili  x   /  AST  22  /  ALT  15  /  AlkPhos  67  03-21    PT/INR - ( 21 Mar 2025 19:54 )   PT: 12.40 sec;   INR: 1.05 ratio         PTT - ( 21 Mar 2025 19:54 )  PTT:30.8 sec        Triglycerides, Serum: 148 mg/dL (03-22-25 @ 05:43)  LDL Cholesterol Calculated: 69 mg/dL (03-22-25 @ 05:43)      Urinalysis Basic - ( 22 Mar 2025 05:43 )    Color: x / Appearance: x / SG: x / pH: x  Gluc: 85 mg/dL / Ketone: x  / Bili: x / Urobili: x   Blood: x / Protein: x / Nitrite: x   Leuk Esterase: x / RBC: x / WBC x   Sq Epi: x / Non Sq Epi: x / Bacteria: x        Medications:  atorvastatin 10 milliGRAM(s) Oral at bedtime  carvedilol 3.125 milliGRAM(s) Oral every 12 hours  chlorhexidine 2% Cloths 1 Application(s) Topical <User Schedule>  enoxaparin Injectable 80 milliGRAM(s) SubCutaneous every 12 hours  tamsulosin 0.4 milliGRAM(s) Oral at bedtime        Allergies    No Known Allergies        Please contact me with any questions or concerns at [phone].

## 2025-03-22 NOTE — CONSULT NOTE ADULT - ASSESSMENT
EP: Dr Bass    84 yr old male with hx of pAFib (on eliquis), PVC, HTN, HLD, s/p AAA repair admitted for arrhythmia. Found to have sustained VT on MCOT    Impression:  - NPO after midnight Sunday for catheterization Monday 3/24  - Switch Eliquis to heparin tomorrow 3/23 in preparation for cath  - Cardiac MRI  - EP Study prior to discharge  - Cont tele monitoring  - Monitor electrolytes, maintain WNL  - Will follow EP: Dr Bass    84 yr old male with hx of pAFib (on eliquis), PVC, HTN, HLD, s/p AAA repair admitted for arrhythmia. Found to have sustained VT on MCOT    Impression:  - NPO after midnight Sunday for catheterization Monday 3/24  - Switch Eliquis to heparin tomorrow 3/23 in preparation for cath  - Cardiac MRI  - EP Study during this admission  - Cont tele monitoring  - Monitor electrolytes, maintain WNL  - Will follow

## 2025-03-22 NOTE — CONSULT NOTE ADULT - NS ATTEND AMEND GEN_ALL_CORE FT
Covering Dr. Bass    complex patient  Wide complex tachycardia consistent with VT  recommend coronary angiogram  recommend CMR  Will plan EP study after Cath and CMR

## 2025-03-22 NOTE — CONSULT NOTE ADULT - SUBJECTIVE AND OBJECTIVE BOX
Patient is a 84y old  Male who presents with a chief complaint of Sustained VT (22 Mar 2025 08:05)    HPI: Patient is an 84 yr old male with hx of pAFib (on Eliquis PVC, HTN, HLD, s/p AAA repair, BPH was sent by Dr. Bass for sustained VT on MCOT. Patient states he had Holter monitor placed the beginning of the month by his PMD Dr. Olmedo and was told that he had A-fib and was started on Eliquis.  Patient saw Dr. Bass today was instructed to come to the ED for multiple episodes of A-fib, bigeminy and sustained VT on MCOT. Patient also endorsed intermittent lightheadedness. He currently he does not have any complains. Denies any palpitation chest pain or sob. Patient is fully functional and able to carry out is ADLS.       PAST MEDICAL & SURGICAL HISTORY:  High cholesterol      BPH (benign prostatic hyperplasia)      S/P AAA repair  2014 stent 2014      History of intravascular stent placement  2014 aaa        PREVIOUS DIAGNOSTIC TESTING:      ECHO  FINDINGS:  < from: TTE Echo Complete w/o Contrast w/ Doppler (03.22.25 @ 08:44) >  Summary:   1. Normal global left ventricular systolic function.   2. LV Ejection Fraction by Kearney's Method with a biplane EF of 54 %.   3. Abnormal septal motion noted likely relatated to frequent PVC's.   4. Normal left atrial size.   5. Normal right atrial size.   6. Mild mitral valve regurgitation.   7. Sclerotic aortic valve with decreased opening.   8. MIld aortic dilatation 3.8 cm.   9. The aortic valve mean gradient is 8.7 mmHg consistent with mild   aortic stenosis.    < end of copied text >      STRESS  FINDINGS:    CATHETERIZATION  FINDINGS:      ELECTROPHYSIOLOGY STUDY  FINDINGS:    CAROTID ULTRASOUND:  FINDINGS    VENOUS DUPLEX SCAN:  FINDINGS:    CHEST CT PULMONARY ANGIO with IV Contrast:  FINDINGS:    MEDICATIONS  (STANDING):  atorvastatin 10 milliGRAM(s) Oral at bedtime  chlorhexidine 2% Cloths 1 Application(s) Topical <User Schedule>  enoxaparin Injectable 80 milliGRAM(s) SubCutaneous every 12 hours  losartan 25 milliGRAM(s) Oral daily  metoprolol tartrate 12.5 milliGRAM(s) Oral every 12 hours  tamsulosin 0.4 milliGRAM(s) Oral at bedtime    MEDICATIONS  (PRN):      FAMILY HISTORY: No significant hx    SOCIAL HISTORY: No smoking, ETOH or illicit drug use    Past Surgical History: see above    Allergies:  No Known Allergies      REVIEW OF SYSTEMS: Mild dizziness; No CP, palpitations, syncope      Vital Signs Last 24 Hrs  T(C): 36.5 (22 Mar 2025 07:19), Max: 36.7 (21 Mar 2025 18:27)  T(F): 97.7 (22 Mar 2025 07:19), Max: 98.1 (21 Mar 2025 18:27)  HR: 64 (22 Mar 2025 07:19) (64 - 80)  BP: 148/70 (22 Mar 2025 07:19) (120/63 - 212/102)  BP(mean): 101 (22 Mar 2025 07:19) (85 - 143)  RR: 18 (22 Mar 2025 07:19) (18 - 20)  SpO2: 90% (22 Mar 2025 07:19) (90% - 99%)    Parameters below as of 22 Mar 2025 04:16  Patient On (Oxygen Delivery Method): room air        PHYSICAL EXAM:  GENERAL: In no apparent distress, well nourished, and hydrated.  NECK: Supple, No JVD   HEART: Regular rate and rhythm; No murmurs, rubs, or gallops.  PULMONARY: Clear to auscultation and perfusion.  No rales, wheezing, or rhonchi bilaterally.  EXTREMITIES:  2+ Peripheral Pulses, no LE edema BL  NEUROLOGICAL: Grossly nonfocal      INTERPRETATION OF TELEMETRY: SR 75 bpm with PVCs    ECG:  < from: 12 Lead ECG (03.21.25 @ 22:05) >  Ventricular Rate 76 BPM    Atrial Rate 76 BPM    P-R Interval 180 ms    QRS Duration 108 ms    Q-T Interval 400 ms    QTC Calculation(Bazett) 450 ms    P Axis 87 degrees    R Axis 67 degrees    T Axis 91 degrees    Diagnosis Line Sinus rhythm with frequent Premature ventricular complexes in a pattern of  bigeminy  Otherwise normal ECG  Confirmed by DUANE WEBER, JACKIE ([id number]) on 3/22/2025 12:02:07 AM    < end of copied text >      I&O's Detail      LABS:                        15.2   6.68  )-----------( 126      ( 22 Mar 2025 05:43 )             44.3     03-22    135  |  101  |  28[H]  ----------------------------<  85  5.0   |  28  |  1.2    Ca    9.5      22 Mar 2025 05:43  Mg     2.1     03-21    TPro  6.8  /  Alb  4.6  /  TBili  0.6  /  DBili  x   /  AST  22  /  ALT  15  /  AlkPhos  67  03-21        PT/INR - ( 21 Mar 2025 19:54 )   PT: 12.40 sec;   INR: 1.05 ratio         PTT - ( 21 Mar 2025 19:54 )  PTT:30.8 sec  Urinalysis Basic - ( 22 Mar 2025 05:43 )    Color: x / Appearance: x / SG: x / pH: x  Gluc: 85 mg/dL / Ketone: x  / Bili: x / Urobili: x   Blood: x / Protein: x / Nitrite: x   Leuk Esterase: x / RBC: x / WBC x   Sq Epi: x / Non Sq Epi: x / Bacteria: x      BNP  I&O's Detail    Daily Height in cm: 180.34 (21 Mar 2025 22:37)    Daily     RADIOLOGY & ADDITIONAL STUDIES: Patient is a 84y old  Male who presents with a chief complaint of Sustained VT (22 Mar 2025 08:05)    HPI: Patient is an 84 yr old male with hx of pAFib (on Eliquis PVC, HTN, HLD, s/p AAA repair, BPH was sent by Dr. Bass for sustained VT on MCOT. Patient states he had Holter monitor placed the beginning of the month by his PMD Dr. Olmedo and was told that he had A-fib and was started on Eliquis.  Patient saw Dr. Bass today was instructed to come to the ED for multiple episodes of A-fib, bigeminy and sustained VT on MCOT. Patient also endorsed intermittent lightheadedness. He currently he does not have any complains. Denies any palpitation chest pain or sob. Patient is fully functional and able to carry out is ADLS.       PAST MEDICAL & SURGICAL HISTORY:  High cholesterol      BPH (benign prostatic hyperplasia)      S/P AAA repair  2014 stent 2014      History of intravascular stent placement  2014 aaa        PREVIOUS DIAGNOSTIC TESTING:      ECHO  FINDINGS:  < from: TTE Echo Complete w/o Contrast w/ Doppler (03.22.25 @ 08:44) >  Summary:   1. Normal global left ventricular systolic function.   2. LV Ejection Fraction by Kearney's Method with a biplane EF of 54 %.   3. Abnormal septal motion noted likely relatated to frequent PVC's.   4. Normal left atrial size.   5. Normal right atrial size.   6. Mild mitral valve regurgitation.   7. Sclerotic aortic valve with decreased opening.   8. MIld aortic dilatation 3.8 cm.   9. The aortic valve mean gradient is 8.7 mmHg consistent with mild   aortic stenosis.    < end of copied text >      MEDICATIONS  (STANDING):  atorvastatin 10 milliGRAM(s) Oral at bedtime  chlorhexidine 2% Cloths 1 Application(s) Topical <User Schedule>  enoxaparin Injectable 80 milliGRAM(s) SubCutaneous every 12 hours  losartan 25 milliGRAM(s) Oral daily  metoprolol tartrate 12.5 milliGRAM(s) Oral every 12 hours  tamsulosin 0.4 milliGRAM(s) Oral at bedtime    MEDICATIONS  (PRN):      FAMILY HISTORY: No significant hx    SOCIAL HISTORY: No smoking, ETOH or illicit drug use    Past Surgical History: see above    Allergies:  No Known Allergies      REVIEW OF SYSTEMS: Mild dizziness; No CP, palpitations, syncope      Vital Signs Last 24 Hrs  T(C): 36.5 (22 Mar 2025 07:19), Max: 36.7 (21 Mar 2025 18:27)  T(F): 97.7 (22 Mar 2025 07:19), Max: 98.1 (21 Mar 2025 18:27)  HR: 64 (22 Mar 2025 07:19) (64 - 80)  BP: 148/70 (22 Mar 2025 07:19) (120/63 - 212/102)  BP(mean): 101 (22 Mar 2025 07:19) (85 - 143)  RR: 18 (22 Mar 2025 07:19) (18 - 20)  SpO2: 90% (22 Mar 2025 07:19) (90% - 99%)    Parameters below as of 22 Mar 2025 04:16  Patient On (Oxygen Delivery Method): room air        PHYSICAL EXAM:  GENERAL: In no apparent distress, well nourished, and hydrated.  NECK: Supple, No JVD   HEART: Regular rate and rhythm; No murmurs, rubs, or gallops.  PULMONARY: Clear to auscultation and perfusion.  No rales, wheezing, or rhonchi bilaterally.  EXTREMITIES:  2+ Peripheral Pulses, no LE edema BL  NEUROLOGICAL: Grossly nonfocal      INTERPRETATION OF TELEMETRY: SR 75 bpm with PVCs    ECG:  < from: 12 Lead ECG (03.21.25 @ 22:05) >  Ventricular Rate 76 BPM    Atrial Rate 76 BPM    P-R Interval 180 ms    QRS Duration 108 ms    Q-T Interval 400 ms    QTC Calculation(Bazett) 450 ms    P Axis 87 degrees    R Axis 67 degrees    T Axis 91 degrees    Diagnosis Line Sinus rhythm with frequent Premature ventricular complexes in a pattern of  bigeminy  Otherwise normal ECG  Confirmed by DUANE WEBER, [facility] ([id number]) on 3/22/2025 12:02:07 AM    < end of copied text >      I&O's Detail      LABS:                        15.2   6.68  )-----------( 126      ( 22 Mar 2025 05:43 )             44.3     03-22    135  |  101  |  28[H]  ----------------------------<  85  5.0   |  28  |  1.2    Ca    9.5      22 Mar 2025 05:43  Mg     2.1     03-21    TPro  6.8  /  Alb  4.6  /  TBili  0.6  /  DBili  x   /  AST  22  /  ALT  15  /  AlkPhos  67  03-21        PT/INR - ( 21 Mar 2025 19:54 )   PT: 12.40 sec;   INR: 1.05 ratio         PTT - ( 21 Mar 2025 19:54 )  PTT:30.8 sec  Urinalysis Basic - ( 22 Mar 2025 05:43 )    Color: x / Appearance: x / SG: x / pH: x  Gluc: 85 mg/dL / Ketone: x  / Bili: x / Urobili: x   Blood: x / Protein: x / Nitrite: x   Leuk Esterase: x / RBC: x / WBC x   Sq Epi: x / Non Sq Epi: x / Bacteria: x      BNP  I&O's Detail    Daily Height in cm: 180.34 (21 Mar 2025 22:37)    Daily     RADIOLOGY & ADDITIONAL STUDIES:

## 2025-03-23 PROCEDURE — 93010 ELECTROCARDIOGRAM REPORT: CPT

## 2025-03-23 PROCEDURE — 99232 SBSQ HOSP IP/OBS MODERATE 35: CPT

## 2025-03-23 RX ADMIN — METOPROLOL SUCCINATE 12.5 MILLIGRAM(S): 50 TABLET, EXTENDED RELEASE ORAL at 06:16

## 2025-03-23 RX ADMIN — Medication 1 APPLICATION(S): at 06:15

## 2025-03-23 RX ADMIN — METOPROLOL SUCCINATE 12.5 MILLIGRAM(S): 50 TABLET, EXTENDED RELEASE ORAL at 17:03

## 2025-03-23 RX ADMIN — ATORVASTATIN CALCIUM 10 MILLIGRAM(S): 80 TABLET, FILM COATED ORAL at 21:16

## 2025-03-23 RX ADMIN — LOSARTAN POTASSIUM 25 MILLIGRAM(S): 100 TABLET, FILM COATED ORAL at 06:17

## 2025-03-23 RX ADMIN — TAMSULOSIN HYDROCHLORIDE 0.4 MILLIGRAM(S): 0.4 CAPSULE ORAL at 21:16

## 2025-03-23 RX ADMIN — ENOXAPARIN SODIUM 80 MILLIGRAM(S): 100 INJECTION SUBCUTANEOUS at 17:03

## 2025-03-23 RX ADMIN — ENOXAPARIN SODIUM 80 MILLIGRAM(S): 100 INJECTION SUBCUTANEOUS at 06:16

## 2025-03-23 NOTE — PROGRESS NOTE ADULT - ASSESSMENT
84 yr old male with hx of pAFib (on Eliquis PVC, HTN, HLD, s/p AAA repair, BPH was sent by Dr. Bass for sustained VT on MCOT. Patient states he had Holter monitor placed the beginning of the month by his PMD Dr. Olmedo and was told that he had A-fib and was started on Eliquis.  Patient saw Dr. Bass today was instructed to come to the ED for multiple episodes of A-fib, bigeminy and sustained VT. Patient also endorsed intermittent lightheadedness. He currently he does not have any complains. Denies any palpitation chest pain or sob.      # Sustained VT on MCOT   # pAfib   continue with  lopressor 12.5 bid  cont ot monitor tele   will monitor electrolytes.   cardiac mri to evaluate for scar or infiltrative disease.   [facility] tomorrow   npo after midnight     # HTN   - continue with losartan 25 qd and  lopressor 12.5 mg bid     # CKD  will cont to monitor   will avoid nephrotoxins     # s/p AAA repair   - hemodynamically stable, euvolemic on exam   - Cr 1.4 (at baseline); eGFR 50    DLD  - c/w home Atorvastatin     will follow EP recs     patient seen examined and plan discussed with pt who understands and agrees on am rounds with DR PATEL .     84 yr old male with hx of pAFib (on Eliquis PVC, HTN, HLD, s/p AAA repair, BPH was sent by Dr. Bass for sustained VT on MCOT. Patient states he had Holter monitor placed the beginning of the month by his PMD Dr. Olmedo and was told that he had A-fib and was started on Eliquis.  Patient saw Dr. Bass today was instructed to come to the ED for multiple episodes of A-fib, bigeminy and sustained VT. Patient also endorsed intermittent lightheadedness. He currently he does not have any complains. Denies any palpitation chest pain or sob.      # Sustained VT on MCOT   # pAfib   continue with  lopressor 12.5 bid  cont ot monitor tele   will monitor electrolytes.   cardiac mri to evaluate for scar or infiltrative disease.   [facility] tomorrow   npo after midnight     # HTN   - continue with losartan 25 qd and  lopressor 12.5 mg bid     # CKD  will cont to monitor   will avoid nephrotoxins   ivf  hydration pre cath     # s/p AAA repair   - hemodynamically stable, euvolemic on exam   - Cr 1.4 (at baseline); eGFR 50    DLD  - c/w home Atorvastatin     will follow EP recs     patient seen examined and plan discussed with pt who understands and agrees on am rounds with DR PATEL .

## 2025-03-23 NOTE — PROGRESS NOTE ADULT - SUBJECTIVE AND OBJECTIVE BOX
Chief complaint: Patient is a 84y old  Male who presents with a chief complaint of Sustained VT (22 Mar 2025 12:15)    Interval history:    seen and examined at the bedside. Denies any acute complaints overnight and at this time. Denies any chest pain sob palpitations dizziness and leg swelling.     Review of systems: A complete 10-point review of systems was obtained and is negative except as stated in the interval history.    Vitals:  T(F): 97.5, Max: 98 (03-22 @ 23:47)  HR: 50 (50 - 60)  BP: 110/56 (110/56 - 146/72)  RR: 18 (18 - 18)  SpO2: 95% (95% - 95%)    Ins & outs:     Weight trend:  Weight (kg): 76.5 (03-21)    Physical exam:  General: No apparent distress  HEENT: Anicteric sclera. Moist mucous membranes. NO JVD  Cardiac: Regular rate and rhythm. No murmurs, rubs, or gallops.   Vascular: Symmetric radial pulses. Dorsalis pedis pulses palpable.   Respiratory: Normal effort. Bibasilar crackles. Clear to ascultation.   Abdomen: Soft, nontender. Audible bowel sounds.   Extremities: Warm with NO edema. No cyanosis or clubbing.  pulses + 2 b/l DP PT and popliteal.   Skin: Warm and dry. No rash.    Neurologic: Grossly normal motor function. CV 2-12 intact.   Psychiatric: Oriented to person, place, and time.       Data reviewed:  - Telemetry: sinus porfirio       - Labs:                        15.2   6.68  )-----------( 126      ( 22 Mar 2025 05:43 )             44.3     03-22    135  |  101  |  28[H]  ----------------------------<  85  5.0   |  28  |  1.2    Ca    9.5      22 Mar 2025 05:43  Mg     2.1     03-21    TPro  6.8  /  Alb  4.6  /  TBili  0.6  /  DBili  x   /  AST  22  /  ALT  15  /  AlkPhos  67  03-21    PT/INR - ( 21 Mar 2025 19:54 )   PT: 12.40 sec;   INR: 1.05 ratio         PTT - ( 21 Mar 2025 19:54 )  PTT:30.8 sec        Triglycerides, Serum: 148 mg/dL (03-22-25 @ 05:43)  LDL Cholesterol Calculated: 69 mg/dL (03-22-25 @ 05:43)    Thyroid Stimulating Hormone, Serum: 1.46 uIU/mL (03-21-25 @ 21:09)    Urinalysis Basic - ( 22 Mar 2025 05:43 )    Color: x / Appearance: x / SG: x / pH: x  Gluc: 85 mg/dL / Ketone: x  / Bili: x / Urobili: x   Blood: x / Protein: x / Nitrite: x   Leuk Esterase: x / RBC: x / WBC x   Sq Epi: x / Non Sq Epi: x / Bacteria: x        Medications:  atorvastatin 10 milliGRAM(s) Oral at bedtime  chlorhexidine 2% Cloths 1 Application(s) Topical <User Schedule>  enoxaparin Injectable 80 milliGRAM(s) SubCutaneous every 12 hours  losartan 25 milliGRAM(s) Oral daily  metoprolol tartrate 12.5 milliGRAM(s) Oral every 12 hours  tamsulosin 0.4 milliGRAM(s) Oral at bedtime    Drips:    PRN:   Allergies  No Known Allergies        Please contact me with any questions or concerns at [phone]. Chief complaint: Patient is a 84y old  Male who presents with a chief complaint of Sustained VT (22 Mar 2025 12:15)    Interval history:    seen and examined at the bedside. Denies any acute complaints overnight and at this time. Denies any chest pain sob palpitations dizziness and leg swelling.     Review of systems: A complete 10-point review of systems was obtained and is negative except as stated in the interval history.    Vitals:  T(F): 97.5, Max: 98 (03-22 @ 23:47)  HR: 50 (50 - 60)  BP: 110/56 (110/56 - 146/72)  RR: 18 (18 - 18)  SpO2: 95% (95% - 95%)    Ins & outs:     Weight trend:  Weight (kg): 76.5 (03-21)    Physical exam:  General: No apparent distress  HEENT: Anicteric sclera. Moist mucous membranes. NO JVD  Cardiac: irregular rate and rhythm. No murmurs, rubs, or gallops.   Vascular: Symmetric radial pulses. Dorsalis pedis pulses palpable.   Respiratory: Normal effort. Bibasilar crackles. Clear to ascultation.   Abdomen: Soft, nontender. Audible bowel sounds.   Extremities: Warm with NO edema. No cyanosis or clubbing.  pulses + 2 b/l DP PT and popliteal.   Skin: Warm and dry. No rash.    Neurologic: Grossly normal motor function. CV 2-12 intact.   Psychiatric: Oriented to person, place, and time.       Data reviewed:  - Telemetry:  afib rate control.       - Labs:                        15.2   6.68  )-----------( 126      ( 22 Mar 2025 05:43 )             44.3     03-22    135  |  101  |  28[H]  ----------------------------<  85  5.0   |  28  |  1.2    Ca    9.5      22 Mar 2025 05:43  Mg     2.1     03-21    TPro  6.8  /  Alb  4.6  /  TBili  0.6  /  DBili  x   /  AST  22  /  ALT  15  /  AlkPhos  67  03-21    PT/INR - ( 21 Mar 2025 19:54 )   PT: 12.40 sec;   INR: 1.05 ratio         PTT - ( 21 Mar 2025 19:54 )  PTT:30.8 sec        Triglycerides, Serum: 148 mg/dL (03-22-25 @ 05:43)  LDL Cholesterol Calculated: 69 mg/dL (03-22-25 @ 05:43)    Thyroid Stimulating Hormone, Serum: 1.46 uIU/mL (03-21-25 @ 21:09)    Urinalysis Basic - ( 22 Mar 2025 05:43 )    Color: x / Appearance: x / SG: x / pH: x  Gluc: 85 mg/dL / Ketone: x  / Bili: x / Urobili: x   Blood: x / Protein: x / Nitrite: x   Leuk Esterase: x / RBC: x / WBC x   Sq Epi: x / Non Sq Epi: x / Bacteria: x        Medications:  atorvastatin 10 milliGRAM(s) Oral at bedtime  chlorhexidine 2% Cloths 1 Application(s) Topical <User Schedule>  enoxaparin Injectable 80 milliGRAM(s) SubCutaneous every 12 hours  losartan 25 milliGRAM(s) Oral daily  metoprolol tartrate 12.5 milliGRAM(s) Oral every 12 hours  tamsulosin 0.4 milliGRAM(s) Oral at bedtime    Drips:    PRN:   Allergies  No Known Allergies        Please contact me with any questions or concerns at [phone].

## 2025-03-24 PROBLEM — I47.20 SUSTAINED VT (VENTRICULAR TACHYCARDIA): Status: ACTIVE | Noted: 2025-03-24

## 2025-03-24 PROBLEM — I48.0 PAROXYSMAL ATRIAL FIBRILLATION: Status: ACTIVE | Noted: 2025-03-24

## 2025-03-24 LAB
ANION GAP SERPL CALC-SCNC: 8 MMOL/L — SIGNIFICANT CHANGE UP (ref 7–14)
APTT BLD: 36.1 SEC — SIGNIFICANT CHANGE UP (ref 27–39.2)
BASOPHILS # BLD AUTO: 0.04 K/UL — SIGNIFICANT CHANGE UP (ref 0–0.2)
BASOPHILS NFR BLD AUTO: 0.8 % — SIGNIFICANT CHANGE UP (ref 0–1)
BUN SERPL-MCNC: 33 MG/DL — HIGH (ref 10–20)
CALCIUM SERPL-MCNC: 8.6 MG/DL — SIGNIFICANT CHANGE UP (ref 8.4–10.5)
CHLORIDE SERPL-SCNC: 107 MMOL/L — SIGNIFICANT CHANGE UP (ref 98–110)
CO2 SERPL-SCNC: 23 MMOL/L — SIGNIFICANT CHANGE UP (ref 17–32)
CREAT SERPL-MCNC: 1.3 MG/DL — SIGNIFICANT CHANGE UP (ref 0.7–1.5)
EGFR: 55 ML/MIN/1.73M2 — LOW
EGFR: 55 ML/MIN/1.73M2 — LOW
EOSINOPHIL # BLD AUTO: 0.1 K/UL — SIGNIFICANT CHANGE UP (ref 0–0.7)
EOSINOPHIL NFR BLD AUTO: 1.9 % — SIGNIFICANT CHANGE UP (ref 0–8)
GLUCOSE SERPL-MCNC: 93 MG/DL — SIGNIFICANT CHANGE UP (ref 70–99)
HCT VFR BLD CALC: 40.6 % — LOW (ref 42–52)
HGB BLD-MCNC: 14 G/DL — SIGNIFICANT CHANGE UP (ref 14–18)
IMM GRANULOCYTES NFR BLD AUTO: 0.8 % — HIGH (ref 0.1–0.3)
INR BLD: 1.01 RATIO — SIGNIFICANT CHANGE UP (ref 0.65–1.3)
LYMPHOCYTES # BLD AUTO: 1.54 K/UL — SIGNIFICANT CHANGE UP (ref 1.2–3.4)
LYMPHOCYTES # BLD AUTO: 29.8 % — SIGNIFICANT CHANGE UP (ref 20.5–51.1)
MAGNESIUM SERPL-MCNC: 2.1 MG/DL — SIGNIFICANT CHANGE UP (ref 1.8–2.4)
MCHC RBC-ENTMCNC: 31.2 PG — HIGH (ref 27–31)
MCHC RBC-ENTMCNC: 34.5 G/DL — SIGNIFICANT CHANGE UP (ref 32–37)
MCV RBC AUTO: 90.4 FL — SIGNIFICANT CHANGE UP (ref 80–94)
MONOCYTES # BLD AUTO: 0.51 K/UL — SIGNIFICANT CHANGE UP (ref 0.1–0.6)
MONOCYTES NFR BLD AUTO: 9.9 % — HIGH (ref 1.7–9.3)
NEUTROPHILS # BLD AUTO: 2.93 K/UL — SIGNIFICANT CHANGE UP (ref 1.4–6.5)
NEUTROPHILS NFR BLD AUTO: 56.8 % — SIGNIFICANT CHANGE UP (ref 42.2–75.2)
NRBC BLD AUTO-RTO: 0 /100 WBCS — SIGNIFICANT CHANGE UP (ref 0–0)
PLATELET # BLD AUTO: 120 K/UL — LOW (ref 130–400)
PMV BLD: 10.7 FL — HIGH (ref 7.4–10.4)
POTASSIUM SERPL-MCNC: 4.6 MMOL/L — SIGNIFICANT CHANGE UP (ref 3.5–5)
POTASSIUM SERPL-SCNC: 4.6 MMOL/L — SIGNIFICANT CHANGE UP (ref 3.5–5)
PROTHROM AB SERPL-ACNC: 11.9 SEC — SIGNIFICANT CHANGE UP (ref 9.95–12.87)
RBC # BLD: 4.49 M/UL — LOW (ref 4.7–6.1)
RBC # FLD: 12.7 % — SIGNIFICANT CHANGE UP (ref 11.5–14.5)
SODIUM SERPL-SCNC: 138 MMOL/L — SIGNIFICANT CHANGE UP (ref 135–146)
WBC # BLD: 5.16 K/UL — SIGNIFICANT CHANGE UP (ref 4.8–10.8)
WBC # FLD AUTO: 5.16 K/UL — SIGNIFICANT CHANGE UP (ref 4.8–10.8)

## 2025-03-24 PROCEDURE — 93458 L HRT ARTERY/VENTRICLE ANGIO: CPT | Mod: 26

## 2025-03-24 PROCEDURE — 99233 SBSQ HOSP IP/OBS HIGH 50: CPT

## 2025-03-24 RX ORDER — AMLODIPINE BESYLATE 10 MG/1
2.5 TABLET ORAL ONCE
Refills: 0 | Status: COMPLETED | OUTPATIENT
Start: 2025-03-24 | End: 2025-03-24

## 2025-03-24 RX ORDER — ASPIRIN 325 MG
324 TABLET ORAL ONCE
Refills: 0 | Status: COMPLETED | OUTPATIENT
Start: 2025-03-24 | End: 2025-03-24

## 2025-03-24 RX ADMIN — Medication 75 MILLILITER(S): at 00:15

## 2025-03-24 RX ADMIN — ATORVASTATIN CALCIUM 10 MILLIGRAM(S): 80 TABLET, FILM COATED ORAL at 22:07

## 2025-03-24 RX ADMIN — AMLODIPINE BESYLATE 2.5 MILLIGRAM(S): 10 TABLET ORAL at 16:37

## 2025-03-24 RX ADMIN — LOSARTAN POTASSIUM 25 MILLIGRAM(S): 100 TABLET, FILM COATED ORAL at 05:13

## 2025-03-24 RX ADMIN — ENOXAPARIN SODIUM 80 MILLIGRAM(S): 100 INJECTION SUBCUTANEOUS at 05:13

## 2025-03-24 RX ADMIN — TAMSULOSIN HYDROCHLORIDE 0.4 MILLIGRAM(S): 0.4 CAPSULE ORAL at 22:07

## 2025-03-24 RX ADMIN — Medication 150 MILLILITER(S): at 17:38

## 2025-03-24 RX ADMIN — Medication 324 MILLIGRAM(S): at 16:37

## 2025-03-24 RX ADMIN — METOPROLOL SUCCINATE 12.5 MILLIGRAM(S): 50 TABLET, EXTENDED RELEASE ORAL at 05:13

## 2025-03-24 RX ADMIN — Medication 1 APPLICATION(S): at 05:12

## 2025-03-24 NOTE — PROGRESS NOTE ADULT - SUBJECTIVE AND OBJECTIVE BOX
[address] [facility]  [email or web address]  [phone]    Eloisa Sandoval MD, [address]       Annual Gynecologic Exam:  [facility] <40  Chief Complaint   Patient presents with    Well Woman         Megan Kennedy is a 25 y.o.  WHITE OR  female who presents for an annual well woman exam.  Patient's last menstrual period was 2017 (exact date). .    With regard to the Gardisil vaccine, she has received all 3 injections. She does report additional concerns today. Patient would like STD testing today. Some mood change second week of menses. Menstrual status:  Her periods are moderate. She does not report dysmenorrhea/painful menses. She does not report irregular bleeding. Sexual history and Contraception:  History   Sexual Activity    Sexual activity: Yes    Partners: Male    Birth control/ protection: Pill     She most of the time use condoms with sexual activity  She does reports new sexual partner(s) in the last year. The patient does request STD testing. We recommended testing per CDC guidelines and at patient request.     Preventive Medicine History:  Her last annual GYN exam was about one year ago. Her most recent Pap smear result: N/A due to age protocol. Her most recent HR HPV screen was not obtained due to age protocol. She does not have a history of JAME 2, 3 or cervical cancer.      Past Medical History:   Diagnosis Date    Chlamydia 2016    PHILIP was negative 2016    Other specified vaccination     Gardasil 3/3 received    Patellar malalignment syndrome     Positive TB test      OB History    Para Term  AB Living   0 0 0 0 0 0   SAB TAB Ectopic Molar Multiple Live Births   0 0 0  0            Past Surgical History:   Procedure Laterality Date    HX WISDOM TEETH EXTRACTION       Family History   Problem Relation Age of Onset    Hypertension Mother     Glaucoma Father      Social History     Social History    Marital status: Chief complaint: Patient is a 84y old  Male who presents with a chief complaint of Sustained VT (22 Mar 2025 12:15)    Interval history: Patient seen and examined bedside. NAD noted. Denies any acute complaints overnight and at this time. Denies any chest pain sob palpitations dizziness and leg swelling. Remains NPO for [facility] today.    Review of systems: A complete 10-point review of systems was obtained and is negative except as stated in the interval history.    Vitals:  Vital Signs Last 24 Hrs  T(C): 36.3 (24 Mar 2025 07:47), Max: 36.6 (23 Mar 2025 23:59)  T(F): 97.3 (24 Mar 2025 07:47), Max: 97.8 (23 Mar 2025 23:59)  HR: 56 (24 Mar 2025 07:47) (53 - 70)  BP: 135/62 (24 Mar 2025 07:47) (127/58 - 179/82)  BP(mean): 89 (24 Mar 2025 07:47) (83 - 91)  RR: 18 (24 Mar 2025 07:47) (18 - 18)  SpO2: 97% (24 Mar 2025 07:47) (95% - 99%)    Parameters below as of 24 Mar 2025 04:44  Patient On (Oxygen Delivery Method): room air    I&O's Summary      Physical exam:  General: No apparent distress  HEENT: Anicteric sclera. Moist mucous membranes. NO JVD  Cardiac: irregular rate and rhythm. No murmurs, rubs, or gallops.   Vascular: Symmetric radial pulses. Dorsalis pedis pulses palpable.   Respiratory: Normal effort. Clear to ascultation.   Abdomen: Soft, nontender. Audible bowel sounds.   Extremities: Warm with NO edema. No cyanosis or clubbing.  pulses + 2 b/l DP PT and popliteal.   Skin: Warm and dry. No rash.    Neurologic: Grossly normal motor function. CV 2-12 intact.   Psychiatric: Oriented to person, place, and time.       Data reviewed:  - Telemetry:  Sinus Marky 46-60 with frequent PVC's  - ECG (03/21/2025):  Sinus rhythm with frequent Premature ventricular complexes in a pattern of bigeminy  - Echo (03/22/2025): LVEF 54%,  Abnormal septal motion noted likely relatated to frequent PVC's, Mild mitral valve regurgitation, Sclerotic aortic valve with decreased opening, MIld aortic dilatation 3.8 cm, The aortic valve mean gradient is 8.7 mmHg consistent with mild aortic stenosis.  - Labs:                        14.0   5.16  )-----------( 120      ( 24 Mar 2025 05:07 )             40.6     03-24    138  |  107  |  33[H]  ----------------------------<  93  4.6   |  23  |  1.3    Ca    8.6      24 Mar 2025 05:07  Mg     2.1     03-24    PT/INR - ( 24 Mar 2025 05:07 )   PT: 11.90 sec;   INR: 1.01 ratio       PTT - ( 24 Mar 2025 05:07 )  PTT:36.1 sec    Lactate Trend    Urinalysis Basic - ( 24 Mar 2025 05:07 )    Color: x / Appearance: x / SG: x / pH: x  Gluc: 93 mg/dL / Ketone: x  / Bili: x / Urobili: x   Blood: x / Protein: x / Nitrite: x   Leuk Esterase: x / RBC: x / WBC x   Sq Epi: x / Non Sq Epi: x / Bacteria: x    Medications:  MEDICATIONS  (STANDING):  atorvastatin 10 milliGRAM(s) Oral at bedtime  chlorhexidine 2% Cloths 1 Application(s) Topical <User Schedule>  losartan 25 milliGRAM(s) Oral daily  metoprolol tartrate 12.5 milliGRAM(s) Oral every 12 hours  sodium chloride 0.9%. 1000 milliLiter(s) (75 mL/Hr) IV Continuous <Continuous>  tamsulosin 0.4 milliGRAM(s) Oral at bedtime    MEDICATIONS  (PRN):  acetaminophen     Tablet .. 650 milliGRAM(s) Oral every 6 hours PRN Mild Pain (1 - 3), Moderate Pain (4 - 6)   SINGLE     Spouse name: N/A    Number of children: N/A    Years of education: N/A     Occupational History    Not on file. Social History Main Topics    Smoking status: Never Smoker    Smokeless tobacco: Never Used    Alcohol use No    Drug use: No    Sexual activity: Yes     Partners: Male     Birth control/ protection: Pill     Other Topics Concern    Not on file     Social History Narrative       No Known Allergies    Current Outpatient Prescriptions   Medication Sig    norethindrone-ethinyl estradiol (MICROGESTIN FE 1/20, 28,) 1 mg-20 mcg (21)/75 mg (7) tab Take 1 Tab by mouth daily.  norethindrone-ethinyl estradiol (JUNEL FE 1/20) 1 mg-20 mcg (21)/75 mg (7) tab Take 1 Tab by mouth daily.  ISONIAZID PO Take  by mouth. No current facility-administered medications for this visit.         Patient Active Problem List   Diagnosis Code    Dysmenorrhea N94.6    Menorrhagia N92.0       Review of Systems - History obtained from the patient  Constitutional: negative for weight loss, fever, night sweats  HEENT: negative for hearing loss, earache, congestion, snoring, sorethroat  CV: negative for chest pain, palpitations, edema  Resp: negative for cough, shortness of breath, wheezing  GI: negative for change in bowel habits, abdominal pain, black or bloody stools  : negative for frequency, dysuria, hematuria  GYN: see HPI  MSK: negative for back pain, joint pain, muscle pain  Breast: negative for breast lumps, nipple discharge, galactorrhea  Skin :negative for itching, rash, hives  Neuro: negative for dizziness, headache, confusion, weakness  Psych: negative for anxiety, depression, change in mood  Heme/lymph: negative for bleeding, bruising, pallor    Physical Exam  Visit Vitals    /74    Ht 5' 3\" (1.6 m)    Wt 167 lb (75.8 kg)    LMP 07/26/2017 (Exact Date)    BMI 29.58 kg/m2       Constitutional  · Appearance: well-nourished, well developed, alert, in no acute distress    HENT  · Head and Face: appears normal    Neck  · Inspection/Palpation: normal appearance, no masses or tenderness  · Lymph Nodes: no lymphadenopathy present  · Thyroid: gland size normal, nontender, no nodules or masses present on palpation    Chest  · Respiratory Effort: breathing unlabored  · Auscultation: normal breath sounds    Cardiovascular  · Heart:  · Auscultation: regular rate and rhythm without murmur    Breasts  · Inspection of Breasts: breasts symmetrical, no skin changes, no discharge present, nipple appearance normal, no skin retraction present  · Palpation of Breasts and Axillae: no masses present on palpation, no breast tenderness  · Axillary Lymph Nodes: no lymphadenopathy present    Gastrointestinal  · Abdominal Examination: abdomen non-tender to palpation, normal bowel sounds, no masses present  · Liver and spleen: no hepatomegaly present, spleen not palpable  · Hernias: no hernias identified    Genitourinary  · External Genitalia: normal appearance for age, no discharge present, no tenderness present, no inflammatory lesions present, no masses present  · Vagina: normal vaginal vault without central or paravaginal defects, no discharge present, no inflammatory lesions present, no masses present  · Bladder: non-tender to palpation  · Urethra: appears normal  · Cervix: normal   · Uterus: normal size, shape and consistency  · Adnexa: no adnexal tenderness present, no adnexal masses present  · Perineum: perineum within normal limits, no evidence of trauma, no rashes or skin lesions present  · Anus: anus within normal limits, no hemorrhoids present  · Inguinal Lymph Nodes: no lymphadenopathy present    Skin  · General Inspection: no rash, no lesions identified    Neurologic/Psychiatric  · Mental Status:  · Orientation: grossly oriented to person, place and time  · Mood and Affect: mood normal, affect appropriate    Assessment:  25 y.o.   for well woman exam  Encounter Diagnoses Name Primary?  Encounter for gynecological examination (general) (routine) with abnormal findings Yes    STD exposure     Irregular menses     Dysmenorrhea        Plan:  The patient was counseled about diet, exercise, healthy lifestyle  We discussed self breast exam  We discussed safer sex practices, condom use and risk factors for sexually transmitted diseases. We discussed current pap smear and HR HPV testing guidelines. We recommend follow up one year for routine annual gynecologic exam or sooner prn  We recommend routine follow up with her primary care doctor for management of chronic medical problems and non-gynecologic concerns  Handouts were given to the patient  We discussed calcium/vitamin D/weight bearing exercise and osteoporosis prevention    Discussed risks, benefits and alternatives of OCP/nuvaring/patch: including but not limited to dvt/pe/mi/cva/ca/gi risks. She is encouraged to read package insert and to follow up with me or her pharmacist with any questions or concerns. Disc option of loestrin 1.5 fe: pt defers, will monitor mood changes    Folllow up:  [x] return for annual well woman exam in one year or sooner if she is having problems  [] follow up and ultrasound  [] 6 months  [] 3 months  [] 6 weeks   [] 1 month    Orders Placed This Encounter    CT/NG/T.VAGINALIS AMPLIFICATION    HEP B SURFACE AG    RPR    HIV SCREEN, [facility]. W/REFLEX CONFIRM    norethindrone-ethinyl estradiol (MICROGESTIN FE 1/20, 28,) 1 mg-20 mcg (21)/75 mg (7) tab    PAP, IG, RFX HPV ASCUS ([id number])       No results found for any visits on 08/15/17.

## 2025-03-24 NOTE — CHART NOTE - NSCHARTNOTEFT_GEN_A_CORE
PROCEDURE:   - Left heart cath     PHYSICIAN: Dr. Arce  FELLOW: Dr. Adam    Pre-procedure Diagnosis: Vtach    Consent: Patient  Anesthesia: Sedation | Local     ACCESS & CLOSURE:  6 Fr R Radial Artery; D-stat     IV Contrast:  mL      Intervention: Successful PCI of  with balloon angioplasty s/p SHEA x    Implants:     AUC:      FINDINGS:     Coronary Dominance:     LM: Minor luminal irregularities.     LAD: Mild disease in proximal segment.   D1: Minor luminal irregularities.     CX: Minor luminal irregularities.   OM1: Mild disease.   LPDA: Mild disease.     RCA: Small non-dominant    LVEDP: 10 mmHg     AV gradient: No significant gradient     ESTIMATED BLOOD LOSS: < 10 mL      CONDITION: Good   SPECIMEN REMOVED: N/A     POST-OP DIAGNOSIS:    - Mild Coronary Artery Disease (< 50% stenosis)      PLAN OF CARE:   [X] Return to In-patient bed   [X] Medications:   - Continue ASA 81 mg PO QD  - High intensity statin  [X] LVEDP guided IV Fluids: NS @ 150cc/h x 3 hours  [X] Remove D-stat. Hold manual pressure if signs of hematoma or bleeding over radial access site. PROCEDURE:   - Left heart cath     PHYSICIAN: Dr. Arce  FELLOW: Dr. Adam    Pre-procedure Diagnosis: Vtach    Consent: Patient  Anesthesia: Sedation | Local     ACCESS & CLOSURE:  6 Fr R Radial Artery; D-stat     IV Contrast: 35 mL      Intervention:    Implants:     AUC:      FINDINGS:     Coronary Dominance: Left    LM: Minor luminal irregularities.     LAD: Mild disease in proximal segment.   D1: Minor luminal irregularities.     CX: Minor luminal irregularities.   OM1: Mild disease.   LPDA: Mild disease.     RCA: Small non-dominant    LVEDP: 10 mmHg     AV gradient: No significant gradient     ESTIMATED BLOOD LOSS: < 10 mL      CONDITION: Good   SPECIMEN REMOVED: N/A     POST-OP DIAGNOSIS:    - Mild Coronary Artery Disease (< 50% stenosis)      PLAN OF CARE:   [X] Return to In-patient bed   [X] Medications:   - Continue ASA 81 mg PO QD  - High intensity statin  [X] LVEDP guided IV Fluids: NS @ 150cc/h x 3 hours  [X] Remove D-stat. Hold manual pressure if signs of hematoma or bleeding over radial access site. PROCEDURE:   - Left heart cath     PHYSICIAN: Dr. Arce  FELLOW: Dr. Adam    Pre-procedure Diagnosis: Vtach    Consent: Patient  Anesthesia: Sedation | Local     ACCESS & CLOSURE:  6 Fr R Radial Artery; D-stat     IV Contrast: 35 mL      Intervention:    Implants:     AUC:      FINDINGS:     Coronary Dominance: Left    LM: Minor luminal irregularities.     LAD: Mild disease in proximal segment.   D1: Minor luminal irregularities.     CX: Minor luminal irregularities.   OM1: Mild disease.   LPDA: Mild disease.     RCA: Small non-dominant    LVEDP: 10 mmHg     AV gradient: No significant gradient     ESTIMATED BLOOD LOSS: < 10 mL      CONDITION: Good   SPECIMEN REMOVED: N/A     POST-OP DIAGNOSIS:    - Mild Coronary Artery Disease (< 50% stenosis)      PLAN OF CARE:   [X] Return to In-patient bed   [X] Medications:   - Continue ASA 81 mg PO QD  - High intensity statin  [X] LVEDP guided IV Fluids: NS @ 150cc/h x 4 hours  [X] Remove D-stat. Hold manual pressure if signs of hematoma or bleeding over radial access site.

## 2025-03-24 NOTE — PROGRESS NOTE ADULT - ASSESSMENT
84 yr old male with hx of pAFib (on Eliquis PVC, HTN, HLD, s/p AAA repair, BPH was sent by Dr. Bass for sustained VT on MCOT. Patient states he had Holter monitor placed the beginning of the month by his PMD Dr. Olmedo and was told that he had A-fib and was started on Eliquis.  Patient saw Dr. Bass today was instructed to come to the ED for multiple episodes of A-fib, bigeminy and sustained VT. Patient also endorsed intermittent lightheadedness. He currently he does not have any complains. Denies any palpitation chest pain or sob.      # Sustained VT on MCOT   # pAfib   - continue with  lopressor 12.5 bid  - will monitor electrolytes  - cardiac mri to evaluate for scar or infiltrative disease after Cath  - NPO for LHC today    # HTN   - continue with losartan 25 qd  - lopressor 12.5 mg bid     # CKD  will cont to monitor   will avoid nephrotoxins   ivf  hydration pre cath     # s/p AAA repair   - hemodynamically stable, euvolemic on exam   - Cr 1.4 (at baseline); eGFR 50    DLD  - c/w home Atorvastatin     will follow EP recs     Please contact me at ext. [phone] with any questions or concerns

## 2025-03-24 NOTE — CHART NOTE - NSCHARTNOTEFT_GEN_A_CORE
INTERVENTIONAL CARDIOLOGY:                                               PREOPERATIVE DAY OF PROCEDURE EVALUATION:  I have personally seen and examined the patient.  I agree with the history and physical which I have reviewed and noted any changes below.         84 yr old M with PMHx of recently Dx pAFib by his PMD (placed on Eliquis a week ago), PVCs, HTN, HLD, s/p AAA repair in 2014, was sent to ED by Dr. Bass for sustained VT on MCOT.  Patient saw Dr. Bass in the office, and was instructed to come to the ED for multiple episodes of A-fib, bigeminy and sustained VT. Patient endorsed intermittent lightheadedness. Otherwise, denies any chest pain, SOB, n/v, diaphoresis, orthopnea, PND, LE edema, palpitations, syncope.  TTE 3/22/25: EF 54%, mild MR and AS.  Pt is now referred for C with possible intervention if clinically indicated, to r/o ischemic etiology of VT.      Pre cath note:  indication:  [ ] STEMI                [ ] NSTEMI                 [ ] Acute coronary syndrome                   [ ]Unstable Angina   [ ] high risk  [ ] intermediate risk  [ ] low risk                   [x ] Stable Angina     non-invasive testing:   [facility]       Date:       03/2025      result: [ ] high risk  [ x] intermediate risk  [ ] low risk    Anti- Anginal medications:                    [ ] not used d/t                     [x] used   ( x) BB     ( ) CCB      ( ) Nitrate   (  ) Ranexa          [ ] not used but strong indication not to use  -Amlodipine 2.5mg po x 1    Ejection Fraction                   [ ] <29            [ ] 30-39%   [ ] 40-49%     [x ]>50%    CHF          [ ] active (within last 14 days on meds   [ ] Chronic (on meds but no exacerbation)  NYHA Functional Class:  (  ) Class I (no limitations)  (  ) Class II (slight limitation)  (  ) Class III (marked limitation)  (  ) Class IV (symptoms at rest)    COPD                   [ ] mild (on chronic bronchodilators)  [ ] moderate (on chronic steroid therapy)      [ ] severe (indication for home O2 or PACO2 >50)    Other risk factors:                     [ ] Previous MI                     [ ] CVA/ stroke                    [ ] carotid stent/ CEA                    [ ] PVD/PAD- (arterial aneurysm, non-palpable pulses, tortuous vessel with inability to insert catheter, infra-renal dissection, renal or subclavian artery stenosis)                    [ ] previous CABG                    [ ] Renal Failure:  on HD  (  ) yes  (  ) no                    [ ] Diabetic  (  ) Type 1  (  ) Type 2                                         (  ) Insulin dependent  (  ) non-insulin dependent                                         (  ) Metformin  (  ) Januvia  (  ) Glimepiride  (  ) Glipizide  (  ) Glyburide  (  ) Actos                                         (  ) GLP-1 receptor agonists (Ozempic, Wegovy, Zepbound, Mounjaro, Trulicity, Byetta, Victoza)                                         (  ) SGLT2 Inhibitors (Farxiga, Jardiance, Invokana)                                         (  ) Other      Bleeding Risk:     Pre-cath Hydration:     (  x) NS @ 75cc/hr until procedure (has been getting IVF since MN last night, Cr 1.3)                                                                                                                              RIGHT RADIAL ARTERY EVALUATION:  CLARISSA TEST: [] Negative          [x] Positive    VS: 163/68, 47, 16, 98% on RA  ECG  3/21/25: NSR @ 76 bpm, PVCs    ASA 324mg po x 1  -received Lovx 80mg @ 5am this am          (Signed electronically by __________)  03-24-25 @ 16:35

## 2025-03-25 LAB
ANION GAP SERPL CALC-SCNC: 10 MMOL/L — SIGNIFICANT CHANGE UP (ref 7–14)
BASOPHILS # BLD AUTO: 0.04 K/UL — SIGNIFICANT CHANGE UP (ref 0–0.2)
BASOPHILS NFR BLD AUTO: 0.8 % — SIGNIFICANT CHANGE UP (ref 0–1)
BUN SERPL-MCNC: 26 MG/DL — HIGH (ref 10–20)
CALCIUM SERPL-MCNC: 9 MG/DL — SIGNIFICANT CHANGE UP (ref 8.4–10.5)
CHLORIDE SERPL-SCNC: 106 MMOL/L — SIGNIFICANT CHANGE UP (ref 98–110)
CO2 SERPL-SCNC: 24 MMOL/L — SIGNIFICANT CHANGE UP (ref 17–32)
CREAT SERPL-MCNC: 1.1 MG/DL — SIGNIFICANT CHANGE UP (ref 0.7–1.5)
EGFR: 67 ML/MIN/1.73M2 — SIGNIFICANT CHANGE UP
EGFR: 67 ML/MIN/1.73M2 — SIGNIFICANT CHANGE UP
EOSINOPHIL # BLD AUTO: 0.12 K/UL — SIGNIFICANT CHANGE UP (ref 0–0.7)
EOSINOPHIL NFR BLD AUTO: 2.3 % — SIGNIFICANT CHANGE UP (ref 0–8)
GLUCOSE SERPL-MCNC: 87 MG/DL — SIGNIFICANT CHANGE UP (ref 70–99)
HCT VFR BLD CALC: 42.3 % — SIGNIFICANT CHANGE UP (ref 42–52)
HGB BLD-MCNC: 14.2 G/DL — SIGNIFICANT CHANGE UP (ref 14–18)
IMM GRANULOCYTES NFR BLD AUTO: 0.8 % — HIGH (ref 0.1–0.3)
LYMPHOCYTES # BLD AUTO: 1.44 K/UL — SIGNIFICANT CHANGE UP (ref 1.2–3.4)
LYMPHOCYTES # BLD AUTO: 27.8 % — SIGNIFICANT CHANGE UP (ref 20.5–51.1)
MAGNESIUM SERPL-MCNC: 2 MG/DL — SIGNIFICANT CHANGE UP (ref 1.8–2.4)
MCHC RBC-ENTMCNC: 31.3 PG — HIGH (ref 27–31)
MCHC RBC-ENTMCNC: 33.6 G/DL — SIGNIFICANT CHANGE UP (ref 32–37)
MCV RBC AUTO: 93.4 FL — SIGNIFICANT CHANGE UP (ref 80–94)
MONOCYTES # BLD AUTO: 0.48 K/UL — SIGNIFICANT CHANGE UP (ref 0.1–0.6)
MONOCYTES NFR BLD AUTO: 9.3 % — SIGNIFICANT CHANGE UP (ref 1.7–9.3)
NEUTROPHILS # BLD AUTO: 3.06 K/UL — SIGNIFICANT CHANGE UP (ref 1.4–6.5)
NEUTROPHILS NFR BLD AUTO: 59 % — SIGNIFICANT CHANGE UP (ref 42.2–75.2)
NRBC BLD AUTO-RTO: 0 /100 WBCS — SIGNIFICANT CHANGE UP (ref 0–0)
PLATELET # BLD AUTO: 113 K/UL — LOW (ref 130–400)
PMV BLD: 10.8 FL — HIGH (ref 7.4–10.4)
POTASSIUM SERPL-MCNC: 4.6 MMOL/L — SIGNIFICANT CHANGE UP (ref 3.5–5)
POTASSIUM SERPL-SCNC: 4.6 MMOL/L — SIGNIFICANT CHANGE UP (ref 3.5–5)
RBC # BLD: 4.53 M/UL — LOW (ref 4.7–6.1)
RBC # FLD: 13 % — SIGNIFICANT CHANGE UP (ref 11.5–14.5)
SODIUM SERPL-SCNC: 140 MMOL/L — SIGNIFICANT CHANGE UP (ref 135–146)
WBC # BLD: 5.18 K/UL — SIGNIFICANT CHANGE UP (ref 4.8–10.8)
WBC # FLD AUTO: 5.18 K/UL — SIGNIFICANT CHANGE UP (ref 4.8–10.8)

## 2025-03-25 PROCEDURE — 99233 SBSQ HOSP IP/OBS HIGH 50: CPT

## 2025-03-25 PROCEDURE — 93010 ELECTROCARDIOGRAM REPORT: CPT

## 2025-03-25 PROCEDURE — 75561 CARDIAC MRI FOR MORPH W/DYE: CPT | Mod: 26

## 2025-03-25 RX ORDER — ENOXAPARIN SODIUM 100 MG/ML
40 INJECTION SUBCUTANEOUS EVERY 24 HOURS
Refills: 0 | Status: DISCONTINUED | OUTPATIENT
Start: 2025-03-26 | End: 2025-03-26

## 2025-03-25 RX ORDER — APIXABAN 2.5 MG/1
5 TABLET, FILM COATED ORAL
Refills: 0 | Status: DISCONTINUED | OUTPATIENT
Start: 2025-03-25 | End: 2025-03-25

## 2025-03-25 RX ADMIN — APIXABAN 5 MILLIGRAM(S): 2.5 TABLET, FILM COATED ORAL at 08:29

## 2025-03-25 RX ADMIN — TAMSULOSIN HYDROCHLORIDE 0.4 MILLIGRAM(S): 0.4 CAPSULE ORAL at 21:09

## 2025-03-25 RX ADMIN — APIXABAN 5 MILLIGRAM(S): 2.5 TABLET, FILM COATED ORAL at 17:08

## 2025-03-25 RX ADMIN — LOSARTAN POTASSIUM 25 MILLIGRAM(S): 100 TABLET, FILM COATED ORAL at 05:29

## 2025-03-25 RX ADMIN — Medication 1 APPLICATION(S): at 05:29

## 2025-03-25 RX ADMIN — METOPROLOL SUCCINATE 12.5 MILLIGRAM(S): 50 TABLET, EXTENDED RELEASE ORAL at 05:29

## 2025-03-25 RX ADMIN — ATORVASTATIN CALCIUM 10 MILLIGRAM(S): 80 TABLET, FILM COATED ORAL at 21:09

## 2025-03-25 NOTE — PROGRESS NOTE ADULT - ASSESSMENT
84 yr old male with hx of pAFib (on Eliquis PVC, HTN, HLD, s/p AAA repair, BPH was sent by Dr. Bass for sustained VT on MCOT. Patient states he had Holter monitor placed the beginning of the month by his PMD Dr. Olmedo and was told that he had A-fib and was started on Eliquis.  Patient saw Dr. Bass today was instructed to come to the ED for multiple episodes of A-fib, bigeminy and sustained VT. Patient also endorsed intermittent lightheadedness. He currently he does not have any complains. Denies any palpitation chest pain or sob.      # Sustained VT on MCOT   # pAfib   - continue with  lopressor 12.5 bid  - will monitor electrolytes  - cardiac mri to evaluate for scar or infiltrative disease after Cath  - NPO for LHC today    # HTN   - continue with losartan 25 qd  - lopressor 12.5 mg bid     # CKD  will cont to monitor   will avoid nephrotoxins   ivf  hydration pre cath     # s/p AAA repair   - hemodynamically stable, euvolemic on exam   - Cr 1.4 (at baseline); eGFR 50    DLD  - c/w home Atorvastatin     will follow EP recs     Please contact me at ext. [phone] with any questions or concerns   84 yr old male with hx of pAFib (on Eliquis PVC, HTN, HLD, s/p AAA repair, BPH was sent by Dr. Bass for sustained VT on MCOT. Patient states he had Holter monitor placed the beginning of the month by his PMD Dr. Olmedo and was told that he had A-fib and was started on Eliquis.  Patient saw Dr. Bass today was instructed to come to the ED for multiple episodes of A-fib, bigeminy and sustained VT. Patient also endorsed intermittent lightheadedness. He currently he does not have any complains. Denies any palpitation chest pain or sob.    # Sustained VT on MCOT   # pAfib   - continue with  lopressor 12.5 bid  - monitor electrolytes  - C on 3/24 was diagnostic with significant disease  - cardiac mri to evaluate for scar or infiltrative disease after Cath  - NPO for cMRI today  - EP study this admission?    # HTN   - continue with losartan 25 qd  - lopressor 12.5 mg bid     # CKD  - will cont to monitor   - will avoid nephrotoxins     # s/p AAA repair   - hemodynamically stable, euvolemic on exam   - Cr 1.4 (at baseline); eGFR 50    DLD  - c/w home Atorvastatin     will follow EP recs     Please contact me at ext. [phone] with any questions or concerns   84 yr old male with hx of pAFib (on Eliquis PVC, HTN, HLD, s/p AAA repair, BPH was sent by Dr. Bass for sustained VT on MCOT. Patient states he had Holter monitor placed the beginning of the month by his PMD Dr. Olmedo and was told that he had A-fib and was started on Eliquis.  Patient saw Dr. Bass today was instructed to come to the ED for multiple episodes of A-fib, bigeminy and sustained VT. Patient also endorsed intermittent lightheadedness. He currently he does not have any complains. Denies any palpitation chest pain or sob.    # Sustained VT on MCOT   # pAfib   - continue with  lopressor 12.5 bid  - monitor electrolytes  - C on 3/24 was diagnostic without significant disease  - cardiac mri to evaluate for scar or infiltrative disease after Cath  - NPO for cMRI today  - EP study this admission?    # HTN   - continue with losartan 25 qd  - lopressor 12.5 mg bid     # CKD  - will cont to monitor   - will avoid nephrotoxins     # s/p AAA repair   - hemodynamically stable, euvolemic on exam   - Cr 1.4 (at baseline); eGFR 50    DLD  - c/w home Atorvastatin     will follow EP recs     Please contact me at ext. [phone] with any questions or concerns   84 yr old male with hx of pAFib (on Eliquis PVC, HTN, HLD, s/p AAA repair, BPH was sent by Dr. Bass for sustained VT on MCOT. Patient states he had Holter monitor placed the beginning of the month by his PMD Dr. Olmedo and was told that he had A-fib and was started on Eliquis.  Patient saw Dr. Bass today was instructed to come to the ED for multiple episodes of A-fib, bigeminy and sustained VT. Patient also endorsed intermittent lightheadedness. He currently he does not have any complains. Denies any palpitation chest pain or sob.    # Sustained VT on MCOT   # pAfib   - discontinued Lopressor 12.5mg secondary to Bradycardia  - monitor electrolytes  - LHC on 3/24 was diagnostic without significant disease  - cardiac mri to evaluate for scar or infiltrative disease after Cath  - NPO for cMRI today  - EP study this admission?    # HTN   - continue with losartan 25 qd    # CKD  - will cont to monitor   - will avoid nephrotoxins     # s/p AAA repair   - hemodynamically stable, euvolemic on exam   - Cr 1.4 (at baseline); eGFR 50    DLD  - c/w home Atorvastatin     will follow EP recs     Please contact me at ext. [phone] with any questions or concerns

## 2025-03-25 NOTE — PROGRESS NOTE ADULT - SUBJECTIVE AND OBJECTIVE BOX
Chief complaint: Patient is a 84y old  Male who presents with a chief complaint of Sustained VT (22 Mar 2025 12:15)    Interval history: Patient seen and examined bedside. NAD noted. Denies any acute complaints overnight and at this time. Denies any chest pain sob palpitations dizziness and leg swelling. NPO this morning for cMRI.    Review of systems: A complete 10-point review of systems was obtained and is negative except as stated in the interval history.    Vitals:  Vital Signs Last 24 Hrs  T(C): 36.8 (25 Mar 2025 04:52), Max: 36.8 (25 Mar 2025 04:52)  T(F): 98.3 (25 Mar 2025 04:52), Max: 98.3 (25 Mar 2025 04:52)  HR: 60 (25 Mar 2025 04:52) (53 - 68)  BP: 128/64 (25 Mar 2025 04:52) (118/65 - 163/68)  BP(mean): 87 (25 Mar 2025 04:52) (87 - 99)  RR: 18 (25 Mar 2025 04:52) (15 - 25)  SpO2: 97% (25 Mar 2025 04:52) (95% - 99%)    Parameters below as of 24 Mar 2025 17:40  Patient On (Oxygen Delivery Method): room air    I&O's Summary    24 Mar 2025 07:01  -  25 Mar 2025 07:00  --------------------------------------------------------  IN: 675 mL / OUT: 0 mL / NET: 675 mL    Physical exam:  General: No apparent distress  HEENT: Anicteric sclera. Moist mucous membranes. NO JVD  Cardiac: irregular rate and rhythm. No murmurs, rubs, or gallops.   Vascular: Symmetric radial pulses. Dorsalis pedis pulses palpable.   Respiratory: Normal effort. Clear to ascultation.   Abdomen: Soft, nontender. Audible bowel sounds.   Extremities: Warm with NO edema. No cyanosis or clubbing.  pulses + 2 b/l DP PT and popliteal.   Skin: Warm and dry. No rash.    Neurologic: Grossly normal motor function. CV 2-12 intact.   Psychiatric: Oriented to person, place, and time.       Data reviewed:  - Telemetry:  Sinus Marky 46-60 with frequent PVC's  - ECG (03/21/2025):  Sinus rhythm with frequent Premature ventricular complexes in a pattern of bigeminy  - Echo (03/22/2025): LVEF 54%,  Abnormal septal motion noted likely relatated to frequent PVC's, Mild mitral valve regurgitation, Sclerotic aortic valve with decreased opening, MIld aortic dilatation 3.8 cm, The aortic valve mean gradient is 8.7 mmHg consistent with mild aortic stenosis.  - Labs:                        14.0   5.16  )-----------( 120      ( 24 Mar 2025 05:07 )             40.6     03-24    138  |  107  |  33[H]  ----------------------------<  93  4.6   |  23  |  1.3    Ca    8.6      24 Mar 2025 05:07  Mg     2.1     03-24    PT/INR - ( 24 Mar 2025 05:07 )   PT: 11.90 sec;   INR: 1.01 ratio       PTT - ( 24 Mar 2025 05:07 )  PTT:36.1 sec    Lactate Trend    Urinalysis Basic - ( 24 Mar 2025 05:07 )    Color: x / Appearance: x / SG: x / pH: x  Gluc: 93 mg/dL / Ketone: x  / Bili: x / Urobili: x   Blood: x / Protein: x / Nitrite: x   Leuk Esterase: x / RBC: x / WBC x   Sq Epi: x / Non Sq Epi: x / Bacteria: x    Medications:  MEDICATIONS  (STANDING):  atorvastatin 10 milliGRAM(s) Oral at bedtime  chlorhexidine 2% Cloths 1 Application(s) Topical <User Schedule>  losartan 25 milliGRAM(s) Oral daily  metoprolol tartrate 12.5 milliGRAM(s) Oral every 12 hours  sodium chloride 0.9%. 1000 milliLiter(s) (75 mL/Hr) IV Continuous <Continuous>  tamsulosin 0.4 milliGRAM(s) Oral at bedtime    MEDICATIONS  (PRN):  acetaminophen     Tablet .. 650 milliGRAM(s) Oral every 6 hours PRN Mild Pain (1 - 3), Moderate Pain (4 - 6)   Chief complaint: Patient is a 84y old  Male who presents with a chief complaint of Sustained VT (22 Mar 2025 12:15)    Interval history: Patient seen and examined bedside. NAD noted. Denies any acute complaints overnight and at this time. Denies any chest pain sob palpitations dizziness and leg swelling. NPO this morning for cMRI.    Review of systems: A complete 10-point review of systems was obtained and is negative except as stated in the interval history.    Vitals:  Vital Signs Last 24 Hrs  T(C): 36.8 (25 Mar 2025 04:52), Max: 36.8 (25 Mar 2025 04:52)  T(F): 98.3 (25 Mar 2025 04:52), Max: 98.3 (25 Mar 2025 04:52)  HR: 60 (25 Mar 2025 04:52) (53 - 68)  BP: 128/64 (25 Mar 2025 04:52) (118/65 - 163/68)  BP(mean): 87 (25 Mar 2025 04:52) (87 - 99)  RR: 18 (25 Mar 2025 04:52) (15 - 25)  SpO2: 97% (25 Mar 2025 04:52) (95% - 99%)    Parameters below as of 24 Mar 2025 17:40  Patient On (Oxygen Delivery Method): room air    I&O's Summary    24 Mar 2025 07:01  -  25 Mar 2025 07:00  --------------------------------------------------------  IN: 675 mL / OUT: 0 mL / NET: 675 mL    Physical exam:  General: No apparent distress  HEENT: Anicteric sclera. Moist mucous membranes. NO JVD  Cardiac: irregular rate and rhythm. No murmurs, rubs, or gallops.   Vascular: Symmetric radial pulses. Dorsalis pedis pulses palpable.   Respiratory: Normal effort. Clear to ascultation.   Abdomen: Soft, nontender. Audible bowel sounds.   Extremities: Warm with NO edema. No cyanosis or clubbing.  pulses + 2 b/l DP PT and popliteal.   Skin: Warm and dry. No rash.    Neurologic: Grossly normal motor function. CV 2-12 intact.   Psychiatric: Oriented to person, place, and time.       Data reviewed:  - Telemetry:  Sinus Marky 43-72 with frequent PVC's  - ECG (03/21/2025):  Sinus rhythm with frequent Premature ventricular complexes in a pattern of bigeminy  - Echo (03/22/2025): LVEF 54%,  Abnormal septal motion noted likely related to frequent PVC's, Mild mitral valve regurgitation, Sclerotic aortic valve with decreased opening, MIld aortic dilatation 3.8 cm, The aortic valve mean gradient is 8.7 mmHg consistent with mild aortic stenosis.  - Left Heart Cath- (03/24/2025)   Coronary Dominance: Left  LM: Minor luminal irregularities.   LAD: Mild disease in proximal segment.   D1: Minor luminal irregularities.   CX: Minor luminal irregularities.   OM1: Mild disease.   LPDA: Mild disease.   RCA: Small non-dominant  LVEDP: 10 mmHg     - Labs:                        14.2   5.18  )-----------( 113      ( 25 Mar 2025 06:36 )             42.3     03-24    138  |  107  |  33[H]  ----------------------------<  93  4.6   |  23  |  1.3    Ca    8.6      24 Mar 2025 05:07  Mg     2.1     03-24    PT/INR - ( 24 Mar 2025 05:07 )   PT: 11.90 sec;   INR: 1.01 ratio       PTT - ( 24 Mar 2025 05:07 )  PTT:36.1 sec    Lactate Trend    Urinalysis Basic - ( 24 Mar 2025 05:07 )    Color: x / Appearance: x / SG: x / pH: x  Gluc: 93 mg/dL / Ketone: x  / Bili: x / Urobili: x   Blood: x / Protein: x / Nitrite: x   Leuk Esterase: x / RBC: x / WBC x   Sq Epi: x / Non Sq Epi: x / Bacteria: x    Medications:  MEDICATIONS  (STANDING):  atorvastatin 10 milliGRAM(s) Oral at bedtime  chlorhexidine 2% Cloths 1 Application(s) Topical <User Schedule>  losartan 25 milliGRAM(s) Oral daily  metoprolol tartrate 12.5 milliGRAM(s) Oral every 12 hours  sodium chloride 0.9%. 1000 milliLiter(s) (150 mL/Hr) IV Continuous <Continuous>  tamsulosin 0.4 milliGRAM(s) Oral at bedtime    MEDICATIONS  (PRN):  acetaminophen     Tablet .. 650 milliGRAM(s) Oral every 6 hours PRN Mild Pain (1 - 3), Moderate Pain (4 - 6)

## 2025-03-26 LAB
ANION GAP SERPL CALC-SCNC: 8 MMOL/L — SIGNIFICANT CHANGE UP (ref 7–14)
APTT BLD: 35.7 SEC — SIGNIFICANT CHANGE UP (ref 27–39.2)
BASOPHILS # BLD AUTO: 0.04 K/UL — SIGNIFICANT CHANGE UP (ref 0–0.2)
BASOPHILS NFR BLD AUTO: 0.7 % — SIGNIFICANT CHANGE UP (ref 0–1)
BUN SERPL-MCNC: 26 MG/DL — HIGH (ref 10–20)
CALCIUM SERPL-MCNC: 9 MG/DL — SIGNIFICANT CHANGE UP (ref 8.4–10.5)
CHLORIDE SERPL-SCNC: 109 MMOL/L — SIGNIFICANT CHANGE UP (ref 98–110)
CO2 SERPL-SCNC: 22 MMOL/L — SIGNIFICANT CHANGE UP (ref 17–32)
CREAT SERPL-MCNC: 1.3 MG/DL — SIGNIFICANT CHANGE UP (ref 0.7–1.5)
EGFR: 55 ML/MIN/1.73M2 — LOW
EGFR: 55 ML/MIN/1.73M2 — LOW
EOSINOPHIL # BLD AUTO: 0.12 K/UL — SIGNIFICANT CHANGE UP (ref 0–0.7)
EOSINOPHIL NFR BLD AUTO: 2.2 % — SIGNIFICANT CHANGE UP (ref 0–8)
GLUCOSE SERPL-MCNC: 95 MG/DL — SIGNIFICANT CHANGE UP (ref 70–99)
HCT VFR BLD CALC: 39.9 % — LOW (ref 42–52)
HGB BLD-MCNC: 13.8 G/DL — LOW (ref 14–18)
IMM GRANULOCYTES NFR BLD AUTO: 0.7 % — HIGH (ref 0.1–0.3)
INR BLD: 1.2 RATIO — SIGNIFICANT CHANGE UP (ref 0.65–1.3)
LYMPHOCYTES # BLD AUTO: 1.52 K/UL — SIGNIFICANT CHANGE UP (ref 1.2–3.4)
LYMPHOCYTES # BLD AUTO: 28.4 % — SIGNIFICANT CHANGE UP (ref 20.5–51.1)
MAGNESIUM SERPL-MCNC: 2 MG/DL — SIGNIFICANT CHANGE UP (ref 1.8–2.4)
MCHC RBC-ENTMCNC: 31.2 PG — HIGH (ref 27–31)
MCHC RBC-ENTMCNC: 34.6 G/DL — SIGNIFICANT CHANGE UP (ref 32–37)
MCV RBC AUTO: 90.3 FL — SIGNIFICANT CHANGE UP (ref 80–94)
MONOCYTES # BLD AUTO: 0.62 K/UL — HIGH (ref 0.1–0.6)
MONOCYTES NFR BLD AUTO: 11.6 % — HIGH (ref 1.7–9.3)
NEUTROPHILS # BLD AUTO: 3.02 K/UL — SIGNIFICANT CHANGE UP (ref 1.4–6.5)
NEUTROPHILS NFR BLD AUTO: 56.4 % — SIGNIFICANT CHANGE UP (ref 42.2–75.2)
NRBC BLD AUTO-RTO: 0 /100 WBCS — SIGNIFICANT CHANGE UP (ref 0–0)
PLATELET # BLD AUTO: 109 K/UL — LOW (ref 130–400)
PMV BLD: 10.7 FL — HIGH (ref 7.4–10.4)
POTASSIUM SERPL-MCNC: 4.7 MMOL/L — SIGNIFICANT CHANGE UP (ref 3.5–5)
POTASSIUM SERPL-SCNC: 4.7 MMOL/L — SIGNIFICANT CHANGE UP (ref 3.5–5)
PROTHROM AB SERPL-ACNC: 14.2 SEC — HIGH (ref 9.95–12.87)
RBC # BLD: 4.42 M/UL — LOW (ref 4.7–6.1)
RBC # FLD: 12.8 % — SIGNIFICANT CHANGE UP (ref 11.5–14.5)
SODIUM SERPL-SCNC: 139 MMOL/L — SIGNIFICANT CHANGE UP (ref 135–146)
WBC # BLD: 5.36 K/UL — SIGNIFICANT CHANGE UP (ref 4.8–10.8)
WBC # FLD AUTO: 5.36 K/UL — SIGNIFICANT CHANGE UP (ref 4.8–10.8)

## 2025-03-26 PROCEDURE — 99233 SBSQ HOSP IP/OBS HIGH 50: CPT

## 2025-03-26 PROCEDURE — 99233 SBSQ HOSP IP/OBS HIGH 50: CPT | Mod: FS,57

## 2025-03-26 RX ADMIN — Medication 1 APPLICATION(S): at 05:06

## 2025-03-26 RX ADMIN — ENOXAPARIN SODIUM 40 MILLIGRAM(S): 100 INJECTION SUBCUTANEOUS at 05:03

## 2025-03-26 RX ADMIN — ATORVASTATIN CALCIUM 10 MILLIGRAM(S): 80 TABLET, FILM COATED ORAL at 21:50

## 2025-03-26 RX ADMIN — TAMSULOSIN HYDROCHLORIDE 0.4 MILLIGRAM(S): 0.4 CAPSULE ORAL at 21:50

## 2025-03-26 RX ADMIN — LOSARTAN POTASSIUM 25 MILLIGRAM(S): 100 TABLET, FILM COATED ORAL at 05:02

## 2025-03-26 NOTE — PROGRESS NOTE ADULT - SUBJECTIVE AND OBJECTIVE BOX
INTERVAL HPI/OVERNIGHT EVENTS:  Multiple PVCs and NSVT overnight  HD stable    MEDICATIONS  (STANDING):  atorvastatin 10 milliGRAM(s) Oral at bedtime  chlorhexidine 2% Cloths 1 Application(s) Topical <User Schedule>  enoxaparin Injectable 40 milliGRAM(s) SubCutaneous every 24 hours  losartan 25 milliGRAM(s) Oral daily  sodium chloride 0.9%. 1000 milliLiter(s) (150 mL/Hr) IV Continuous <Continuous>  tamsulosin 0.4 milliGRAM(s) Oral at bedtime    MEDICATIONS  (PRN):  acetaminophen     Tablet .. 650 milliGRAM(s) Oral every 6 hours PRN Mild Pain (1 - 3), Moderate Pain (4 - 6)      Allergies    No Known Allergies    Intolerances        REVIEW OF SYSTEMS: No CP, palpitations, dizziness or SOB     Vital Signs Last 24 Hrs  T(C): 36.3 (26 Mar 2025 15:20), Max: 36.8 (25 Mar 2025 23:46)  T(F): 97.3 (26 Mar 2025 15:20), Max: 98.3 (25 Mar 2025 23:46)  HR: 55 (26 Mar 2025 15:20) (52 - 72)  BP: 173/82 (26 Mar 2025 15:20) (111/58 - 184/79)  BP(mean): 118 (26 Mar 2025 15:20) (77 - 118)  RR: 18 (26 Mar 2025 15:20) (18 - 18)  SpO2: 96% (26 Mar 2025 04:49) (94% - 96%)    Parameters below as of 26 Mar 2025 04:49  Patient On (Oxygen Delivery Method): room air        03-25-25 @ 07:01  -  03-26-25 @ 07:00  --------------------------------------------------------  IN: 360 mL / OUT: 400 mL / NET: -40 mL      Physical Exam  GENERAL: In no apparent distress, well nourished, and hydrated.  EYES: EOMI, PERRLA, conjunctiva and sclera clear  NECK: Supple  HEART: Regular rate and rhythm; No murmurs, rubs, or gallops.  PULMONARY: Clear to auscultation and perfusion.  No rales, wheezing, or rhonchi bilaterally.  EXTREMITIES:  2+ Peripheral Pulses, No clubbing, cyanosis, or edema  NEUROLOGICAL: Grossly nonfocal      LABS:                        13.8   5.36  )-----------( 109      ( 26 Mar 2025 05:14 )             39.9     03-26    139  |  109  |  26[H]  ----------------------------<  95  4.7   |  22  |  1.3    Ca    9.0      26 Mar 2025 05:14  Mg     2.0     03-26      PT/INR - ( 26 Mar 2025 05:14 )   PT: 14.20 sec;   INR: 1.20 ratio         PTT - ( 26 Mar 2025 05:14 )  PTT:35.7 sec  Urinalysis Basic - ( 26 Mar 2025 05:14 )    Color: x / Appearance: x / SG: x / pH: x  Gluc: 95 mg/dL / Ketone: x  / Bili: x / Urobili: x   Blood: x / Protein: x / Nitrite: x   Leuk Esterase: x / RBC: x / WBC x   Sq Epi: x / Non Sq Epi: x / Bacteria: x        RADIOLOGY & ADDITIONAL TESTS:

## 2025-03-26 NOTE — PROGRESS NOTE ADULT - ASSESSMENT
EP: Dr Bass    84 yr old male with hx of pAFib (on eliquis), PVC, HTN, HLD, s/p AAA repair admitted for arrhythmia. Found to have sustained VT on MCOT    Impression:  VT  NSVT/PVCs  Paroxysmal AF  HTN  HLD    Plan:  - NPO after midnight for AICD implant tomorrow 3/27  - Hold anticoagulation tonight and tomorrow AM  - Please send type and screen  - Cont tele monitoring  - Monitor electrolytes, maintain WNL  - Will follow

## 2025-03-26 NOTE — PROGRESS NOTE ADULT - ASSESSMENT
Assessment	  84 yr old male with hx of pAFib (on Eliquis PVC, HTN, HLD, s/p AAA repair, BPH was sent by Dr. Bass for sustained VT on MCOT. Patient states he had Holter monitor placed the beginning of the month by his PMD Dr. Olmedo and was told that he had A-fib and was started on Eliquis.  Underwent LHC 3/24 which showed mild non ob cad, underwent Cmri  on 3/25 which showed Normal LV wall thickness, EF 44%, myocardial scar involving the mid-myocardial aspect of the basal anteroseptum and basal inferoseptum. NPO after MN for ICD placement tomorrow.     # Sustained VT on MCOT   - discontinued Lopressor 12.5mg secondary to Bradycardia  - LHC on 3/24 was diagnostic without significant disease  - Cardiac mri 3/25- showed Normal LV wall thickness, EF 44%, myocardial scar involving the mid-myocardial aspect of the basal anteroseptum and basal inferoseptum  - monitor electrolytes   - NPO after MN for ICD placement tomorrow 3/27  - SGLT2I on dc     # pAfib   - Last dose of Eliquis was on 3/25 PM   - Plan for Outpatient Afib ablation     # HTN   - continue with losartan 25 qd    # CKD  - will cont to monitor   - will avoid nephrotoxins     # s/p AAA repair   - hemodynamically stable, euvolemic on exam   - Cr 1.3 (at baseline); eGFR 50    DLD  - c/w home Atorvastatin     Please contact me at ext. [phone] with any questions or concerns

## 2025-03-26 NOTE — CHART NOTE - NSCHARTNOTEFT_GEN_A_CORE
Electrophysiology PA Note    Pt is scheduled for ___ICD placement (ADDON)    keep NPO after MN  Hold Heparin / Lovenox / NOAC tonight and in AM  Hold / Do NOT restart GLP-1 and SGLT2 Inhibitors  please send full set of labs including coags and type and in AM      LABS:                        13.8   5.36  )-----------( 109      ( 26 Mar 2025 05:14 )             39.9       03-26    139  |  109  |  26[H]  ----------------------------<  95  4.7   |  22  |  1.3    Ca    9.0      26 Mar 2025 05:14  Mg     2.0     03-26        PT/INR - ( 26 Mar 2025 05:14 )   PT: 14.20 sec;   INR: 1.20 ratio         PTT - ( 26 Mar 2025 05:14 )  PTT:35.7 sec        Thyroid Stimulating Hormone, Serum: 1.46 uIU/mL [0.27 - 4.20] (03-21-25 @ 21:09)

## 2025-03-26 NOTE — PROGRESS NOTE ADULT - SUBJECTIVE AND OBJECTIVE BOX
Chief complaint: Patient is a 84y old  Male who presents with a chief complaint of Sustained VT (25 Mar 2025 07:17)    Interval history: Patient seen and examined bedside. cMRI performed yesterday. No overnights noted. Npo after mn for ICD.     Vitals:  T(F): 97.3, Max: 98.3 (03-25 @ 23:46)  HR: 71 (52 - 72)  BP: 111/58 (111/58 - 184/79)  RR: 18 (18 - 18)  SpO2: 96% (94% - 99%)    Ins & outs:     03-24 @ 07:01  -  03-25 @ 07:00  --------------------------------------------------------  IN: 675 mL / OUT: 0 mL / NET: 675 mL    03-25 @ 07:01  -  03-26 @ 07:00  --------------------------------------------------------  IN: 360 mL / OUT: 400 mL / NET: -40 mL      Weight trend:  Weight (kg): 76.5 (03-24)    Physical exam:  General: No apparent distress  HEENT: Anicteric sclera. Moist mucous membranes. NO JVD  Cardiac: irregular rate and rhythm. No murmurs, rubs, or gallops.   Vascular: Symmetric radial pulses. Dorsalis pedis pulses palpable.   Respiratory: Normal effort. Clear to ascultation.   Abdomen: Soft, nontender. Audible bowel sounds.   Extremities: Warm with NO edema. No cyanosis or clubbing.  pulses + 2 b/l DP PT and popliteal.   Skin: Warm and dry. No rash.    Neurologic: Grossly normal motor function. CV 2-12 intact.   Psychiatric: Oriented to person, place, and time.       Data reviewed:  - Telemetry:  NSR  50-72 with frequent PVC's  - ECG (03/21/2025):  Sinus rhythm with frequent Premature ventricular complexes in a pattern of bigeminy  - Echo (03/22/2025): LVEF 54%,  Abnormal septal motion noted likely related to frequent PVC's, Mild mitral valve regurgitation, Sclerotic aortic valve with decreased opening, MIld aortic dilatation 3.8 cm, The aortic valve mean gradient is 8.7 mmHg consistent with mild aortic stenosis.  - Left Heart Cath- (03/24/2025)   Coronary Dominance: Left  LM: Minor luminal irregularities.   LAD: Mild disease in proximal segment.   D1: Minor luminal irregularities.   CX: Minor luminal irregularities.   OM1: Mild disease.   LPDA: Mild disease.   RCA: Small non-dominant  LVEDP: 10 mmHg     Cmri 3/25/2025- Normal LV wall thickness, EF 44%, myocardial scar involving the mid-myocardial aspect of the basal   anteroseptum and basal inferoseptum. This is consistent with a   non-specific, non-ischemic cardiomyopathy.      - Labs:                        13.8   5.36  )-----------( 109      ( 26 Mar 2025 05:14 )             39.9     03-26    139  |  109  |  26[H]  ----------------------------<  95  4.7   |  22  |  1.3    Ca    9.0      26 Mar 2025 05:14  Mg     2.0     03-26      PT/INR - ( 26 Mar 2025 05:14 )   PT: 14.20 sec;   INR: 1.20 ratio         PTT - ( 26 Mar 2025 05:14 )  PTT:35.7 sec        Triglycerides, Serum: 148 mg/dL (03-22-25 @ 05:43)  LDL Cholesterol Calculated: 69 mg/dL (03-22-25 @ 05:43)    Thyroid Stimulating Hormone, Serum: 1.46 uIU/mL (03-21-25 @ 21:09)    Urinalysis Basic - ( 26 Mar 2025 05:14 )    Color: x / Appearance: x / SG: x / pH: x  Gluc: 95 mg/dL / Ketone: x  / Bili: x / Urobili: x   Blood: x / Protein: x / Nitrite: x   Leuk Esterase: x / RBC: x / WBC x   Sq Epi: x / Non Sq Epi: x / Bacteria: x        Medications:  atorvastatin 10 milliGRAM(s) Oral at bedtime  chlorhexidine 2% Cloths 1 Application(s) Topical <User Schedule>  enoxaparin Injectable 40 milliGRAM(s) SubCutaneous every 24 hours  losartan 25 milliGRAM(s) Oral daily  tamsulosin 0.4 milliGRAM(s) Oral at bedtime    Drips:  sodium chloride 0.9%. 1000 milliLiter(s) (150 mL/Hr) IV Continuous <Continuous>    PRN:     Allergies    No Known Allergies    Intolerances

## 2025-03-27 ENCOUNTER — TRANSCRIPTION ENCOUNTER (OUTPATIENT)
Age: 85
End: 2025-03-27

## 2025-03-27 LAB
ANION GAP SERPL CALC-SCNC: 10 MMOL/L — SIGNIFICANT CHANGE UP (ref 7–14)
APTT BLD: 31.8 SEC — SIGNIFICANT CHANGE UP (ref 27–39.2)
BUN SERPL-MCNC: 23 MG/DL — HIGH (ref 10–20)
CALCIUM SERPL-MCNC: 9 MG/DL — SIGNIFICANT CHANGE UP (ref 8.4–10.5)
CHLORIDE SERPL-SCNC: 107 MMOL/L — SIGNIFICANT CHANGE UP (ref 98–110)
CO2 SERPL-SCNC: 24 MMOL/L — SIGNIFICANT CHANGE UP (ref 17–32)
CREAT SERPL-MCNC: 1.2 MG/DL — SIGNIFICANT CHANGE UP (ref 0.7–1.5)
EGFR: 60 ML/MIN/1.73M2 — SIGNIFICANT CHANGE UP
EGFR: 60 ML/MIN/1.73M2 — SIGNIFICANT CHANGE UP
GLUCOSE SERPL-MCNC: 95 MG/DL — SIGNIFICANT CHANGE UP (ref 70–99)
HCT VFR BLD CALC: 39.5 % — LOW (ref 42–52)
HGB BLD-MCNC: 13.8 G/DL — LOW (ref 14–18)
INR BLD: 1.07 RATIO — SIGNIFICANT CHANGE UP (ref 0.65–1.3)
MAGNESIUM SERPL-MCNC: 2 MG/DL — SIGNIFICANT CHANGE UP (ref 1.8–2.4)
MCHC RBC-ENTMCNC: 31.1 PG — HIGH (ref 27–31)
MCHC RBC-ENTMCNC: 34.9 G/DL — SIGNIFICANT CHANGE UP (ref 32–37)
MCV RBC AUTO: 89 FL — SIGNIFICANT CHANGE UP (ref 80–94)
NRBC BLD AUTO-RTO: 0 /100 WBCS — SIGNIFICANT CHANGE UP (ref 0–0)
PLATELET # BLD AUTO: 111 K/UL — LOW (ref 130–400)
PMV BLD: 10.8 FL — HIGH (ref 7.4–10.4)
POTASSIUM SERPL-MCNC: 4.6 MMOL/L — SIGNIFICANT CHANGE UP (ref 3.5–5)
POTASSIUM SERPL-SCNC: 4.6 MMOL/L — SIGNIFICANT CHANGE UP (ref 3.5–5)
PROTHROM AB SERPL-ACNC: 12.6 SEC — SIGNIFICANT CHANGE UP (ref 9.95–12.87)
RBC # BLD: 4.44 M/UL — LOW (ref 4.7–6.1)
RBC # FLD: 12.9 % — SIGNIFICANT CHANGE UP (ref 11.5–14.5)
SODIUM SERPL-SCNC: 141 MMOL/L — SIGNIFICANT CHANGE UP (ref 135–146)
WBC # BLD: 5.22 K/UL — SIGNIFICANT CHANGE UP (ref 4.8–10.8)
WBC # FLD AUTO: 5.22 K/UL — SIGNIFICANT CHANGE UP (ref 4.8–10.8)

## 2025-03-27 PROCEDURE — 99233 SBSQ HOSP IP/OBS HIGH 50: CPT

## 2025-03-27 PROCEDURE — 99233 SBSQ HOSP IP/OBS HIGH 50: CPT | Mod: FS,57

## 2025-03-27 PROCEDURE — 71045 X-RAY EXAM CHEST 1 VIEW: CPT | Mod: 26

## 2025-03-27 PROCEDURE — 33249 INSJ/RPLCMT DEFIB W/LEAD(S): CPT

## 2025-03-27 RX ORDER — VANCOMYCIN HCL IN 5 % DEXTROSE 1.5G/250ML
PLASTIC BAG, INJECTION (ML) INTRAVENOUS
Refills: 0 | Status: COMPLETED | OUTPATIENT
Start: 2025-03-27 | End: 2025-03-28

## 2025-03-27 RX ORDER — VANCOMYCIN HCL IN 5 % DEXTROSE 1.5G/250ML
1000 PLASTIC BAG, INJECTION (ML) INTRAVENOUS ONCE
Refills: 0 | Status: COMPLETED | OUTPATIENT
Start: 2025-03-27 | End: 2025-03-27

## 2025-03-27 RX ORDER — APIXABAN 2.5 MG/1
1 TABLET, FILM COATED ORAL
Refills: 0 | DISCHARGE

## 2025-03-27 RX ORDER — VANCOMYCIN HCL IN 5 % DEXTROSE 1.5G/250ML
1000 PLASTIC BAG, INJECTION (ML) INTRAVENOUS ONCE
Refills: 0 | Status: DISCONTINUED | OUTPATIENT
Start: 2025-03-27 | End: 2025-03-28

## 2025-03-27 RX ORDER — VANCOMYCIN HCL IN 5 % DEXTROSE 1.5G/250ML
1000 PLASTIC BAG, INJECTION (ML) INTRAVENOUS EVERY 12 HOURS
Refills: 0 | Status: COMPLETED | OUTPATIENT
Start: 2025-03-27 | End: 2025-03-28

## 2025-03-27 RX ADMIN — ATORVASTATIN CALCIUM 10 MILLIGRAM(S): 80 TABLET, FILM COATED ORAL at 21:15

## 2025-03-27 RX ADMIN — TAMSULOSIN HYDROCHLORIDE 0.4 MILLIGRAM(S): 0.4 CAPSULE ORAL at 21:15

## 2025-03-27 RX ADMIN — Medication 250 MILLIGRAM(S): at 10:34

## 2025-03-27 RX ADMIN — LOSARTAN POTASSIUM 25 MILLIGRAM(S): 100 TABLET, FILM COATED ORAL at 05:34

## 2025-03-27 RX ADMIN — Medication 250 MILLIGRAM(S): at 17:34

## 2025-03-27 RX ADMIN — Medication 1 APPLICATION(S): at 05:34

## 2025-03-27 NOTE — DISCHARGE NOTE PROVIDER - NSDCCPTREATMENT_GEN_ALL_CORE_FT
PRINCIPAL PROCEDURE  Procedure: Implantation, ICD  Findings and Treatment: - You should restart your Eliquis 5mg twice a day on the evening of 03/29/2025.  - Do not shower for 5 days  - Do not drive or operate heavy machinery for 1 week  - Do not submerge in water (example: baths, swimming) for 1 month.  - Do not lift your left arm greater than shoulder height for 6 weeks.  - Do not lift anything heavier than 5-10 lbs with your left arm for 6 weeks.  - Any sudden swelling, redness, fever, discharge, or severe pain, call the Electrophysiology Office at [phone].

## 2025-03-27 NOTE — DISCHARGE NOTE PROVIDER - HOSPITAL COURSE
Patient is a 84y Male with PMHx of pAFib (on Eliquis) PVC's, HTN, HLD, s/p AAA repair, BPH was sent by Dr. Bass for sustained VT on MCOT. Patient states he had Holter monitor placed the beginning of the month by his PMD Dr. Olmedo and was told that he had A-fib and was started on Eliquis.  Patient saw Dr. Bass today was instructed to come to the ED for multiple episodes of A-fib, bigeminy and sustained VT. Patient also endorsed intermittent lightheadedness. On 03/27/2025 patient underwent successful [employer] Dual Chamber ICD implantation. Patient was monitored overnight. On POD 1 patient remains HD stable with no complaints. Examination of ICD pocket is C/D/I with no hematoma or erythema. Device interrogation reveals normal device function and CXR was negative for pneumothorax. Patient is being DC home in stable condition.    On 03/24/2025 patient underwent LHC which revealed  LM: Minor luminal irregularities, LAD: Mild disease in proximal segment, D1: Minor luminal irregularities, CX: Minor luminal irregularities, OM1: Mild disease, LPDA: Mild disease, RCA: Small non-dominant, LVEDP: 10 mmHg. Patient was monitored overnight. On POD 1 patient remained HD stable with no complaints. Patient remained in SR with no arrhythmias noted on tele. EKG performed showed no acute ST changes. Examination of right radial artery showed a C/DI site with no hematoma, erythema or bruit. Distal pulses are 2+ bilaterally. Renal function remains stable post cath.    On 03/25/2025 patient underwent Cardiac MRI that revealed Mildly reduced left ventricular systolic function (LVEF 44%, visually estimated EF: 45-50%), Mild, global hypokinesis, myocardial scar involving the mid-myocardial aspect of the basal anteroseptum and basal inferoseptum. This is consistent with a non-specific, non-ischemic cardiomyopathy. Normal right ventricular size. Mildly reduced right ventricular systolic function (RVEF 39%).    FINDINGS:     Coronary Dominance: Left    LM: Minor luminal irregularities.     LAD: Mild disease in proximal segment.   D1: Minor luminal irregularities.     CX: Minor luminal irregularities.   OM1: Mild disease.   LPDA: Mild disease.     RCA: Small non-dominant    LVEDP: 10 mmHg     < from: MR Cardiac w/wo IV Cont (03.25.25 @ 10:20) >    1. Technically difficult study due to substantial gating related artifact.  2. Normal left ventricular size. Normal left ventricular wall thickness.   Mildly reduced left ventricular systolic function (LVEF 44%, visually   estimated EF: 45-50%). Mild, global hypokinesis.  3. Myocardial scar involving the mid-myocardial aspect of the basal   anteroseptum and basal inferoseptum. This is consistent with a   non-specific, non-ischemic cardiomyopathy.  4. Normal right ventricular size. Mildly reduced right ventricular   systolic function (RVEF 39%).    < end of copied text >

## 2025-03-27 NOTE — CHART NOTE - NSCHARTNOTEFT_GEN_A_CORE
Electrophysiology Brief Post-Operative Note    I have personally seen and examined the patient.  I agree with the history and physical which I have reviewed and noted any changes below.    DEVICE COMPANY:  -[employer]    PRE-OP DIAGNOSIS:   Ventricular Tachycardia    POST-OP DIAGNOSIS:   Ventricular Tachycardia    PROCEDURE:  Defibrillator Implant    Physician: SHRAVAN Aguilar MD  Assistant: None    ANESTHESIA TYPE:  [  ]General Anesthesia  [X] Sedation  [X] Local/Regional    CONDITION  [  ] Critical  [  ] Serious  [  ]Fair  [X]Good    SPECIMENS REMOVED (IF APPLICABLE): None    IMPLANTS (IF APPLICABLE)  Dual Chamber Defibrillator Implant    FINDINGS (see below)   -Successful Defibrillator implant   -No immediate complications   -Estimated Blood Loss: 20  mL   -Contrast used: none    PLAN OF CARE  - Vancomyocin 1g IV q12 h for 2 doses  - Chest X-ray portable now  - Chest X-ray PA/Lat tomorrow at 6am  - Device interrogation tomorrow in am  - Discontinue Heparin, Lovenox, and NOACS for 48 hours  - No anticoagulation unless discussed with EP attending      FOLLOW-UP  -Outpatient follow-up with Electrophysiology in 3-4 weeks     [address] [place] [address] ([phone])[facility] [phone].

## 2025-03-27 NOTE — DISCHARGE NOTE PROVIDER - YES NO FOR MLM POSITIVE OR NEGATIVE COVID RESULT
Patient called.  He said Dr. Braxton is no longer his PCP, and that he will now see Dr. Sorto.    He saw Dr. Sorto on 8-, and is wondering if Dr. Sorto can refill his lisinopril, or if he will need an appointment?     If refill can be given when would Dr. Sorto like to see him next?    Abbey Raphael,          ,

## 2025-03-27 NOTE — DISCHARGE NOTE PROVIDER - CARE PROVIDER_API CALL
Wilfredo Aguilar  Cardiac Electrophysiology  [address] [place] [address], [address]  [place] [address]  Phone: [phone]  Fax: [phone]  Follow Up Time: 1 month

## 2025-03-27 NOTE — DISCHARGE NOTE PROVIDER - NSDCCPCAREPLAN_GEN_ALL_CORE_FT
PRINCIPAL DISCHARGE DIAGNOSIS  Diagnosis: Ventricular tachycardia  Assessment and Plan of Treatment:       SECONDARY DISCHARGE DIAGNOSES  Diagnosis: Afib  Assessment and Plan of Treatment:

## 2025-03-27 NOTE — PROGRESS NOTE ADULT - SUBJECTIVE AND OBJECTIVE BOX
Chief complaint: Patient is a 84y old  Male who presents with a chief complaint of Sustained VT (25 Mar 2025 07:17)    Interval history: Patient seen and examined bedside. No overnights noted. Npo after for ICD today.     Vitals:  Vital Signs Last 24 Hrs  T(C): 36.5 (27 Mar 2025 13:28), Max: 36.6 (27 Mar 2025 09:20)  T(F): 97.7 (27 Mar 2025 13:28), Max: 97.8 (27 Mar 2025 09:20)  HR: 78 (27 Mar 2025 14:13) (54 - 78)  BP: 128/68 (27 Mar 2025 14:13) (102/75 - 185/86)  BP(mean): 100 (27 Mar 2025 14:13) (84 - 124)  RR: 20 (27 Mar 2025 14:13) (16 - 20)  SpO2: 96% (27 Mar 2025 14:13) (95% - 98%)    Parameters below as of 27 Mar 2025 14:13  Patient On (Oxygen Delivery Method): room air    I&O's Summary      Physical exam:  General: No apparent distress  HEENT: Anicteric sclera. Moist mucous membranes. NO JVD  Cardiac: irregular rate and rhythm. No murmurs, rubs, or gallops.   Vascular: Symmetric radial pulses. Dorsalis pedis pulses palpable.   Respiratory: Normal effort. Clear to ascultation.   Abdomen: Soft, nontender. Audible bowel sounds.   Extremities: Warm with NO edema. No cyanosis or clubbing.  pulses + 2 b/l DP PT and popliteal.   Skin: Warm and dry. No rash.    Neurologic: Grossly normal motor function. CV 2-12 intact.   Psychiatric: Oriented to person, place, and time.       Data reviewed:  - Telemetry:  NSR  50-72 with frequent PVC's  - ECG (03/21/2025):  Sinus rhythm with frequent Premature ventricular complexes in a pattern of bigeminy  - Echo (03/22/2025): LVEF 54%,  Abnormal septal motion noted likely related to frequent PVC's, Mild mitral valve regurgitation, Sclerotic aortic valve with decreased opening, MIld aortic dilatation 3.8 cm, The aortic valve mean gradient is 8.7 mmHg consistent with mild aortic stenosis.  - Left Heart Cath- (03/24/2025)   Coronary Dominance: Left  LM: Minor luminal irregularities.   LAD: Mild disease in proximal segment.   D1: Minor luminal irregularities.   CX: Minor luminal irregularities.   OM1: Mild disease.   LPDA: Mild disease.   RCA: Small non-dominant  LVEDP: 10 mmHg     Cmri 3/25/2025- Normal LV wall thickness, EF 44%, myocardial scar involving the mid-myocardial aspect of the basal   anteroseptum and basal inferoseptum. This is consistent with a   non-specific, non-ischemic cardiomyopathy.    - Labs:                        13.8   5.22  )-----------( 111      ( 27 Mar 2025 05:28 )             39.5     03-27    141  |  107  |  23[H]  ----------------------------<  95  4.6   |  24  |  1.2    Ca    9.0      27 Mar 2025 05:28  Mg     2.0     03-27    PT/INR - ( 27 Mar 2025 05:28 )   PT: 12.60 sec;   INR: 1.07 ratio       PTT - ( 27 Mar 2025 05:28 )  PTT:31.8 sec    Lactate Trend    Urinalysis Basic - ( 27 Mar 2025 05:28 )    Color: x / Appearance: x / SG: x / pH: x  Gluc: 95 mg/dL / Ketone: x  / Bili: x / Urobili: x   Blood: x / Protein: x / Nitrite: x   Leuk Esterase: x / RBC: x / WBC x   Sq Epi: x / Non Sq Epi: x / Bacteria: x    Medications:  MEDICATIONS  (STANDING):  atorvastatin 10 milliGRAM(s) Oral at bedtime  chlorhexidine 2% Cloths 1 Application(s) Topical <User Schedule>  losartan 25 milliGRAM(s) Oral daily  sodium chloride 0.9%. 1000 milliLiter(s) (150 mL/Hr) IV Continuous <Continuous>  tamsulosin 0.4 milliGRAM(s) Oral at bedtime  vancomycin  IVPB 1000 milliGRAM(s) IV Intermittent every 12 hours  vancomycin  IVPB 1000 milliGRAM(s) IV Intermittent once  vancomycin  IVPB        MEDICATIONS  (PRN):  acetaminophen     Tablet .. 650 milliGRAM(s) Oral every 6 hours PRN Mild Pain (1 - 3), Moderate Pain (4 - 6)      PRN:     Allergies    No Known Allergies    Intolerances

## 2025-03-27 NOTE — PROGRESS NOTE ADULT - ASSESSMENT
EP: Dr Bass    84 yr old male with hx of pAFib (on eliquis), PVC, HTN, HLD, s/p AAA repair admitted for arrhythmia. Found to have sustained VT on MCOT    Impression:  VT  NSVT/PVCs  Paroxysmal AF  HTN  HLD    Plan:  - NPO for ICD today  - Hold anticoagulation  - Cont tele monitoring  - Monitor electrolytes, maintain WNL  - Will follow

## 2025-03-27 NOTE — DISCHARGE NOTE PROVIDER - NSDCMRMEDTOKEN_GEN_ALL_CORE_FT
apixaban 5 mg oral tablet: 5 milligram(s) orally 2 times a day  atorvastatin 10 mg oral tablet: 1 tab(s) orally every 48 hours  Flomax 0.4 mg oral capsule: 1 cap(s) orally once a day  losartan 25 mg oral tablet: 1 tab(s) orally once a day  oxycodone-acetaminophen 5 mg-325 mg oral tablet: 1 tab(s) orally every 6 hours MDD:4   apixaban 5 mg oral tablet: 5 milligram(s) orally 2 times a day  atorvastatin 10 mg oral tablet: 1 tab(s) orally every 48 hours  Flomax 0.4 mg oral capsule: 1 cap(s) orally once a day  losartan 25 mg oral tablet: 1 tab(s) orally once a day  metoprolol succinate 25 mg oral tablet, extended release: 1 tab(s) orally once a day  oxycodone-acetaminophen 5 mg-325 mg oral tablet: 1 tab(s) orally every 6 hours MDD:4   apixaban 5 mg oral tablet: 5 milligram(s) orally 2 times a day  atorvastatin 10 mg oral tablet: 1 tab(s) orally every 48 hours  Entresto 24 mg-26 mg oral tablet: 1 tab(s) orally 2 times a day  Farxiga 10 mg oral tablet: 1 tab(s) orally once a day  Flomax 0.4 mg oral capsule: 1 cap(s) orally once a day  metoprolol succinate 25 mg oral tablet, extended release: 1 tab(s) orally once a day  oxycodone-acetaminophen 5 mg-325 mg oral tablet: 1 tab(s) orally every 6 hours MDD:4

## 2025-03-27 NOTE — PRE-ANESTHESIA EVALUATION ADULT - NSANTHOSAYNRD_GEN_A_CORE
DENIES/No. PATTIE screening performed.  STOP BANG Legend: 0-2 = LOW Risk; 3-4 = INTERMEDIATE Risk; 5-8 = HIGH Risk

## 2025-03-27 NOTE — PROGRESS NOTE ADULT - SUBJECTIVE AND OBJECTIVE BOX
INTERVAL HPI/OVERNIGHT EVENTS:  PVCs/NSVT overnight; patient stable    MEDICATIONS  (STANDING):  atorvastatin 10 milliGRAM(s) Oral at bedtime  chlorhexidine 2% Cloths 1 Application(s) Topical <User Schedule>  losartan 25 milliGRAM(s) Oral daily  sodium chloride 0.9%. 1000 milliLiter(s) (150 mL/Hr) IV Continuous <Continuous>  tamsulosin 0.4 milliGRAM(s) Oral at bedtime  vancomycin  IVPB 1000 milliGRAM(s) IV Intermittent once  vancomycin  IVPB      vancomycin  IVPB 1000 milliGRAM(s) IV Intermittent once  vancomycin  IVPB 1000 milliGRAM(s) IV Intermittent every 12 hours    MEDICATIONS  (PRN):  acetaminophen     Tablet .. 650 milliGRAM(s) Oral every 6 hours PRN Mild Pain (1 - 3), Moderate Pain (4 - 6)      Allergies    No Known Allergies    Intolerances        REVIEW OF SYSTEMS: No CP, palpitations, dizziness or SOB     Vital Signs Last 24 Hrs  T(C): 36.6 (27 Mar 2025 10:08), Max: 36.6 (27 Mar 2025 09:20)  T(F): 97.8 (27 Mar 2025 09:20), Max: 97.8 (27 Mar 2025 09:20)  HR: 54 (27 Mar 2025 10:08) (54 - 74)  BP: 150/70 (27 Mar 2025 10:08) (121/61 - 185/86)  BP(mean): 100 (27 Mar 2025 10:08) (84 - 124)  RR: 16 (27 Mar 2025 10:08) (16 - 18)  SpO2: 96% (27 Mar 2025 10:08) (95% - 96%)    Parameters below as of 27 Mar 2025 09:20  Patient On (Oxygen Delivery Method): room air        Physical Exam  GENERAL: In no apparent distress, well nourished, and hydrated.  EYES: EOMI, PERRLA, conjunctiva and sclera clear  NECK: Supple  HEART: Regular rate and rhythm; No murmurs, rubs, or gallops.  PULMONARY: Clear to auscultation and perfusion.  No rales, wheezing, or rhonchi bilaterally.  EXTREMITIES:  2+ Peripheral Pulses, No clubbing, cyanosis, or edema  NEUROLOGICAL: Grossly nonfocal    LABS:                        13.8   5.22  )-----------( 111      ( 27 Mar 2025 05:28 )             39.5     03-27    141  |  107  |  23[H]  ----------------------------<  95  4.6   |  24  |  1.2    Ca    9.0      27 Mar 2025 05:28  Mg     2.0     03-27      PT/INR - ( 27 Mar 2025 05:28 )   PT: 12.60 sec;   INR: 1.07 ratio         PTT - ( 27 Mar 2025 05:28 )  PTT:31.8 sec  Urinalysis Basic - ( 27 Mar 2025 05:28 )    Color: x / Appearance: x / SG: x / pH: x  Gluc: 95 mg/dL / Ketone: x  / Bili: x / Urobili: x   Blood: x / Protein: x / Nitrite: x   Leuk Esterase: x / RBC: x / WBC x   Sq Epi: x / Non Sq Epi: x / Bacteria: x        RADIOLOGY & ADDITIONAL TESTS:

## 2025-03-27 NOTE — PROGRESS NOTE ADULT - ASSESSMENT
Assessment	  84 yr old male with hx of pAFib (on Eliquis PVC, HTN, HLD, s/p AAA repair, BPH was sent by Dr. Bass for sustained VT on MCOT. Patient states he had Holter monitor placed the beginning of the month by his PMD Dr. Olmedo and was told that he had A-fib and was started on Eliquis.  Underwent LHC 3/24 which showed mild non ob cad, underwent Cmri  on 3/25 which showed Normal LV wall thickness, EF 44%, myocardial scar involving the mid-myocardial aspect of the basal anteroseptum and basal inferoseptum. NPO after MN for ICD placement tomorrow.     # Sustained VT on MCOT   - discontinued Lopressor 12.5mg secondary to Bradycardia  - LHC on 3/24 was diagnostic without significant disease  - Cardiac mri 3/25- showed Normal LV wall thickness, EF 44%, myocardial scar involving the mid-myocardial aspect of the basal anteroseptum and basal inferoseptum  - monitor electrolytes   - NPO for ICD placement today  - SGLT2I on dc     # pAfib   - Last dose of Eliquis was on 3/25 PM   - Plan for Outpatient Afib ablation     # HTN   - continue with losartan 25 qd    # CKD  - will cont to monitor   - will avoid nephrotoxins     # s/p AAA repair   - hemodynamically stable, euvolemic on exam   - Cr 1.3 (at baseline); eGFR 50    DLD  - c/w home Atorvastatin     Please contact me at ext. [phone] with any questions or concerns

## 2025-03-27 NOTE — DISCHARGE NOTE PROVIDER - NSDCFUSCHEDAPPT_GEN_ALL_CORE_FT
Noelle Webb  [facility]  ELECTROPH [address] [place] [address]  Scheduled Appointment: 04/24/2025

## 2025-03-27 NOTE — DISCHARGE NOTE PROVIDER - ATTENDING DISCHARGE PHYSICAL EXAMINATION:
I saw and evaluated the patient the day of discharge.  They were admitted for evaluation of VT seen on an event monitor.  Ischemic workup was negative as above and cMRI showed non-specific LGE with a mildly reduced EF of 44%.  He underwent ICD implant and is hemodynamically stable and comfortable the day of discharge.  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

## 2025-03-28 ENCOUNTER — TRANSCRIPTION ENCOUNTER (OUTPATIENT)
Age: 85
End: 2025-03-28

## 2025-03-28 VITALS — SYSTOLIC BLOOD PRESSURE: 118 MMHG | TEMPERATURE: 98 F | DIASTOLIC BLOOD PRESSURE: 72 MMHG | HEART RATE: 92 BPM

## 2025-03-28 LAB
ANION GAP SERPL CALC-SCNC: 10 MMOL/L — SIGNIFICANT CHANGE UP (ref 7–14)
BUN SERPL-MCNC: 16 MG/DL — SIGNIFICANT CHANGE UP (ref 10–20)
CALCIUM SERPL-MCNC: 9.5 MG/DL — SIGNIFICANT CHANGE UP (ref 8.4–10.5)
CHLORIDE SERPL-SCNC: 102 MMOL/L — SIGNIFICANT CHANGE UP (ref 98–110)
CO2 SERPL-SCNC: 28 MMOL/L — SIGNIFICANT CHANGE UP (ref 17–32)
CREAT SERPL-MCNC: 1.3 MG/DL — SIGNIFICANT CHANGE UP (ref 0.7–1.5)
EGFR: 55 ML/MIN/1.73M2 — LOW
EGFR: 55 ML/MIN/1.73M2 — LOW
GLUCOSE SERPL-MCNC: 126 MG/DL — HIGH (ref 70–99)
POTASSIUM SERPL-MCNC: 5 MMOL/L — SIGNIFICANT CHANGE UP (ref 3.5–5)
POTASSIUM SERPL-SCNC: 5 MMOL/L — SIGNIFICANT CHANGE UP (ref 3.5–5)
SODIUM SERPL-SCNC: 140 MMOL/L — SIGNIFICANT CHANGE UP (ref 135–146)

## 2025-03-28 PROCEDURE — 93283 PRGRMG EVAL IMPLANTABLE DFB: CPT | Mod: 26

## 2025-03-28 PROCEDURE — 71046 X-RAY EXAM CHEST 2 VIEWS: CPT | Mod: 26

## 2025-03-28 PROCEDURE — 99233 SBSQ HOSP IP/OBS HIGH 50: CPT | Mod: FS,24

## 2025-03-28 PROCEDURE — 99239 HOSP IP/OBS DSCHRG MGMT >30: CPT

## 2025-03-28 PROCEDURE — 93010 ELECTROCARDIOGRAM REPORT: CPT

## 2025-03-28 RX ORDER — DAPAGLIFLOZIN 5 MG/1
1 TABLET, FILM COATED ORAL
Qty: 30 | Refills: 0
Start: 2025-03-28 | End: 2025-04-26

## 2025-03-28 RX ORDER — METOPROLOL SUCCINATE 50 MG/1
1 TABLET, EXTENDED RELEASE ORAL
Qty: 30 | Refills: 1
Start: 2025-03-28 | End: 2025-05-26

## 2025-03-28 RX ORDER — SACUBITRIL AND VALSARTAN 6; 6 MG/1; MG/1
1 PELLET ORAL
Qty: 60 | Refills: 0
Start: 2025-03-28 | End: 2025-04-26

## 2025-03-28 RX ORDER — METOPROLOL SUCCINATE 50 MG/1
25 TABLET, EXTENDED RELEASE ORAL DAILY
Refills: 0 | Status: DISCONTINUED | OUTPATIENT
Start: 2025-03-28 | End: 2025-03-28

## 2025-03-28 RX ORDER — LOSARTAN POTASSIUM 100 MG/1
1 TABLET, FILM COATED ORAL
Refills: 0 | DISCHARGE

## 2025-03-28 RX ADMIN — LOSARTAN POTASSIUM 25 MILLIGRAM(S): 100 TABLET, FILM COATED ORAL at 05:30

## 2025-03-28 RX ADMIN — Medication 250 MILLIGRAM(S): at 05:30

## 2025-03-28 RX ADMIN — Medication 1 APPLICATION(S): at 05:30

## 2025-03-28 RX ADMIN — METOPROLOL SUCCINATE 25 MILLIGRAM(S): 50 TABLET, EXTENDED RELEASE ORAL at 11:37

## 2025-03-28 NOTE — PROGRESS NOTE ADULT - SUBJECTIVE AND OBJECTIVE BOX
INTERVAL HPI/OVERNIGHT EVENTS:    Patient s/p [employer] ICD  No events over night. Pt without complaints    MEDICATIONS  (STANDING):  atorvastatin 10 milliGRAM(s) Oral at bedtime  chlorhexidine 2% Cloths 1 Application(s) Topical <User Schedule>  losartan 25 milliGRAM(s) Oral daily  sodium chloride 0.9%. 1000 milliLiter(s) (150 mL/Hr) IV Continuous <Continuous>  tamsulosin 0.4 milliGRAM(s) Oral at bedtime  vancomycin  IVPB 1000 milliGRAM(s) IV Intermittent once    MEDICATIONS  (PRN):  acetaminophen     Tablet .. 650 milliGRAM(s) Oral every 6 hours PRN Mild Pain (1 - 3), Moderate Pain (4 - 6)      Allergies    No Known Allergies    Intolerances      Vital Signs Last 24 Hrs  T(C): 36.4 (28 Mar 2025 08:09), Max: 36.6 (27 Mar 2025 23:36)  T(F): 97.6 (28 Mar 2025 08:09), Max: 97.9 (27 Mar 2025 23:36)  HR: 92 (28 Mar 2025 08:09) (69 - 92)  BP: 118/72 (28 Mar 2025 08:09) (102/75 - 160/78)  BP(mean): 90 (28 Mar 2025 08:09) (88 - 112)  RR: 17 (28 Mar 2025 04:42) (17 - 20)  SpO2: 96% (28 Mar 2025 04:42) (94% - 98%)    Parameters below as of 28 Mar 2025 04:42  Patient On (Oxygen Delivery Method): room air      Wound healing well; No hematoma; no bleeding      RADIOLOGY & ADDITIONAL TESTS:       INTERVAL HPI/OVERNIGHT EVENTS:    Patient s/p [employer] ICD  6 beats NSVT overnight. Pt without complaints    MEDICATIONS  (STANDING):  atorvastatin 10 milliGRAM(s) Oral at bedtime  chlorhexidine 2% Cloths 1 Application(s) Topical <User Schedule>  losartan 25 milliGRAM(s) Oral daily  sodium chloride 0.9%. 1000 milliLiter(s) (150 mL/Hr) IV Continuous <Continuous>  tamsulosin 0.4 milliGRAM(s) Oral at bedtime  vancomycin  IVPB 1000 milliGRAM(s) IV Intermittent once    MEDICATIONS  (PRN):  acetaminophen     Tablet .. 650 milliGRAM(s) Oral every 6 hours PRN Mild Pain (1 - 3), Moderate Pain (4 - 6)      Allergies    No Known Allergies    Intolerances      Vital Signs Last 24 Hrs  T(C): 36.4 (28 Mar 2025 08:09), Max: 36.6 (27 Mar 2025 23:36)  T(F): 97.6 (28 Mar 2025 08:09), Max: 97.9 (27 Mar 2025 23:36)  HR: 92 (28 Mar 2025 08:09) (69 - 92)  BP: 118/72 (28 Mar 2025 08:09) (102/75 - 160/78)  BP(mean): 90 (28 Mar 2025 08:09) (88 - 112)  RR: 17 (28 Mar 2025 04:42) (17 - 20)  SpO2: 96% (28 Mar 2025 04:42) (94% - 98%)    Parameters below as of 28 Mar 2025 04:42  Patient On (Oxygen Delivery Method): room air      Wound healing well; No hematoma; no bleeding      RADIOLOGY & ADDITIONAL TESTS:       INTERVAL HPI/OVERNIGHT EVENTS:  Patient s/p [employer] ICD  6 beats NSVT overnight. Pt without complaints    MEDICATIONS  (STANDING):  atorvastatin 10 milliGRAM(s) Oral at bedtime  chlorhexidine 2% Cloths 1 Application(s) Topical <User Schedule>  losartan 25 milliGRAM(s) Oral daily  sodium chloride 0.9%. 1000 milliLiter(s) (150 mL/Hr) IV Continuous <Continuous>  tamsulosin 0.4 milliGRAM(s) Oral at bedtime  vancomycin  IVPB 1000 milliGRAM(s) IV Intermittent once    MEDICATIONS  (PRN):  acetaminophen     Tablet .. 650 milliGRAM(s) Oral every 6 hours PRN Mild Pain (1 - 3), Moderate Pain (4 - 6)      Allergies    No Known Allergies    Intolerances      Vital Signs Last 24 Hrs  T(C): 36.4 (28 Mar 2025 08:09), Max: 36.6 (27 Mar 2025 23:36)  T(F): 97.6 (28 Mar 2025 08:09), Max: 97.9 (27 Mar 2025 23:36)  HR: 92 (28 Mar 2025 08:09) (69 - 92)  BP: 118/72 (28 Mar 2025 08:09) (102/75 - 160/78)  BP(mean): 90 (28 Mar 2025 08:09) (88 - 112)  RR: 17 (28 Mar 2025 04:42) (17 - 20)  SpO2: 96% (28 Mar 2025 04:42) (94% - 98%)    Parameters below as of 28 Mar 2025 04:42  Patient On (Oxygen Delivery Method): room air      Wound healing well; No hematoma; no bleeding      RADIOLOGY & ADDITIONAL TESTS:

## 2025-03-28 NOTE — CHART NOTE - NSCHARTNOTEFT_GEN_A_CORE
Discussed with pt at bedside this morning. Pt has a good appetite; consuming >75% of meals provided in-house. Tolerating diet texture/consistency well. No nausea or vomiting reported. Last BM 2 days ago. Skin: surgical incision; no pressure injuries noted. No edema noted.     Nutrition Hx: Pt had a good appetite prior to admit; consumed 2 meals daily. Pt takes vitamin D daily. Denies drinking protein shake supplements. NKFA; denies any restrictive cultural or Taoism food preferences. No chewing or swallowing difficulties noted with regular textured food.     Weight Hx: -175#/77-79.5 KG -- checked 2 months ago per pt. Current dosing weight is 76.5 KG. Weight stable within 2 months at this time. Pt does not meet weight criteria for malnutrition. NFPE: no overt s/s of malnutrition.     Low risk f/u    RD to remain available: Kari Jones [phone] or TEAMS

## 2025-03-28 NOTE — PROGRESS NOTE ADULT - ASSESSMENT
A/P   Patient  s/p [employer] ICD  - CXR shows leads in good position.  No ptx  - Device interrogation done.  No events.  Device is functioning properly  - Pt cannot raise left arm above shoulder level x 6 weeks  - Pt cannot lift >5 pounds with left arm x 6 weeks  - no shower x 1 week  - no bath/swimming x 6 weeks  - Pt can eat  - ok to discharge home today  - Follow up in 1 month  with EP   A/P   Patient  s/p [employer] ICD  NSVT  - recommend start Beta blocker  - CXR shows leads in good position.  No ptx  - Device interrogation done.  No events.  Device is functioning properly  - Pt cannot raise left arm above shoulder level x 6 weeks  - Pt cannot lift >5 pounds with left arm x 6 weeks  - no shower x 1 week  - no bath/swimming x 6 weeks  - Pt can eat  - ok to discharge home today  - Follow up in 1 month  with EP

## 2025-03-28 NOTE — PROGRESS NOTE ADULT - TIME-BASED
Pt rico asking for a call back stating that someone called her from her  I returned pt's call, lmom apologizing for missing her and asked her to please call back 
50
50
35

## 2025-03-28 NOTE — DISCHARGE NOTE NURSING/CASE MANAGEMENT/SOCIAL WORK - FINANCIAL ASSISTANCE
[facility] provides services at a reduced cost to those who are determined to be eligible through [facility]’s financial assistance program. Information regarding [facility]’s financial assistance program can be found by going to [email or web address] or by calling [phone].

## 2025-03-28 NOTE — DISCHARGE NOTE NURSING/CASE MANAGEMENT/SOCIAL WORK - NSDCPEELIQUIS_GEN_ALL_CORE
Apixaban/Eliquis - Compliance/Apixaban/Eliquis - Dietary Advice/Apixaban/Eliquis - Follow up monitoring/Apixaban/Eliquis - Potential for adverse drug reactions and interactions Ambulatory

## 2025-03-28 NOTE — PROGRESS NOTE ADULT - REASON FOR ADMISSION
Sustained VT

## 2025-03-28 NOTE — CHART NOTE - NSCHARTNOTESELECT_GEN_ALL_CORE
Brief Operative EP report
Electrophysiology PA Note
pre-cath/Event Note
Cath/Event Note
LOS/Nutrition Services

## 2025-03-28 NOTE — PROGRESS NOTE ADULT - NS ATTEND OPT1 GEN_ALL_CORE

## 2025-03-28 NOTE — PROGRESS NOTE ADULT - NS ATTEND AMEND GEN_ALL_CORE FT
84M, patient of Dr. Bass, with known paroxysmal AF on AC, AAA s/p repair, and HTN who was found on MCOT to have asymptomatic sustained VT, referred for admission with expedited cardiac workup.    LHC showed mild nonobstructive CAD.  cMRI showed mildly reduced EF of 44% with non-specific/non-ischemic LGE pattern.    Discussed care with EP.  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 Switch ARB to ARNI at discharge.
Covering Dr. Bass  Complex patient  Plan for ICD implant in  for secondary prevention of VT  A thorough discussion was held with the patient concerning all aspects of ICD therapy. We reviewed the data supporting ICD therapy and how it applies individually. We discussed the risks, nature of procedure, and follow up care after device is implanted, including outcomes of ICD implantation and living with an ICD. We discussed management of ICD therapy throughout life, including deactivation of the ICD. Patient is in agreement with proceeding with the device implant. We discussed the risks of bleeding, hematoma, injury to vessels and heart, perforation, tamponade, pneumothorax, infection, lead dislodgment, device malfunction, and rare risks of stroke/heart attack/death. Patient expressed understanding of the discussion. After all questions were answered, it was a shared decision to proceed with ICD therapy.
84M, patient of Dr. Bass, with known paroxysmal AF on AC, AAA s/p repair, and HTN who was found on MCOT to have asymptomatic sustained VT, referred for admission with expedited cardiac workup.    Planned for CMR and LHC.  Continue BB, statin, ARB    Further care pending results of the above testing
84M, patient of Dr. Bass, with known paroxysmal AF on AC, AAA s/p repair, and HTN who was found on MCOT to have asymptomatic sustained VT, referred for admission with expedited cardiac workup.    LHC showed mild nonobstructive CAD.  cMRI showed mildly reduced EF of 44% with non-specific/non-ischemic LGE pattern.    Discussed care with EP.  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 Switch ARB to ARNI at discharge.
84yoM patient of Dr. Bass with pAFib (on Eliquis), HTN, HLD, history of AAA s/p repair, and BPH who was sent to the ED for sustained VT on MCOT. No syncope. ECG with NSR and ventricular bigeminy. TTE dated 3/22/25 with LVEF 54%, abnormal septal wall motion c/w arrhythmia, and mild AS. Patient started on Lopressor 12.5 mg BID. Plan for LHC and CMR.     Plan:   - Continue Lopressor 12.5 mg BID  - Continue home losartan 25 mg daily, atorvastatin 10 mg nightly, and Flomax 0.4 mg daily   - LHC on Monday  - CMR for evaluation of scar and/or infiltrative disease
Covering Dr. Bass   Sustained VT  scheduled for ICD for today  A thorough discussion was held with the patient concerning all aspects of ICD therapy. We reviewed the data supporting ICD therapy and how it applies individually. We discussed the risks, nature of procedure, and follow up care after device is implanted, including outcomes of ICD implantation and living with an ICD. We discussed management of ICD therapy throughout life, including deactivation of the ICD. Patient is in agreement with proceeding with the device implant. We discussed the risks of bleeding, hematoma, injury to vessels and heart, perforation, tamponade, pneumothorax, infection, lead dislodgment, device malfunction, and rare risks of stroke/heart attack/death. Patient expressed understanding of the discussion. After all questions were answered, it was a shared decision to proceed with ICD therapy.
s/p ICD.   routine post op care  start BB  resume Eliquis for AF on 3/29 PM  follow up with Dr. Bass
84yoM patient of Dr. Bass with pAFib (on Eliquis), HTN, HLD, history of AAA s/p repair, and BPH who was sent to the ED for sustained VT on MCOT. No syncope. ECG with NSR and ventricular bigeminy. TTE dated 3/22/25 with LVEF 54%, abnormal septal wall motion c/w arrhythmia, and mild AS. Patient started on Lopressor 12.5 mg BID. Plan for LHC and CMR.     Plan:   - Start Lopressor 12.5 mg BID  - Continue home losartan 25 mg daily, atorvastatin 10 mg nightly, and Flomax 0.4 mg daily   - LHC on Monday  - CMR for evaluation of scar and/or infiltrative disease
84M, patient of Dr. Bass, with known paroxysmal AF on AC, AAA s/p repair, and HTN who was found on MCOT to have asymptomatic sustained VT, referred for admission with expedited cardiac workup.    LHC showed mild nonobstructive CAD.  cMRI showed mildly reduced EF of 44% with non-specific/non-ischemic LGE pattern.    Discussed care with EP.  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 Switch ARB to ARNI at discharge.

## 2025-03-29 RX ORDER — APIXABAN 2.5 MG/1
5 TABLET, FILM COATED ORAL
Qty: 60 | Refills: 0
Start: 2025-03-29 | End: 2025-04-27

## 2025-03-29 RX ORDER — APIXABAN 2.5 MG/1
5 TABLET, FILM COATED ORAL
Qty: 0 | Refills: 0 | DISCHARGE
Start: 2025-03-29

## 2025-04-01 ENCOUNTER — APPOINTMENT (OUTPATIENT)
Dept: ELECTROPHYSIOLOGY | Facility: CLINIC | Age: 85
End: 2025-04-01

## 2025-04-09 DIAGNOSIS — I47.20 VENTRICULAR TACHYCARDIA, UNSPECIFIED: ICD-10-CM

## 2025-04-09 DIAGNOSIS — E87.5 HYPERKALEMIA: ICD-10-CM

## 2025-04-09 DIAGNOSIS — I13.0 HYPERTENSIVE HEART AND CHRONIC KIDNEY DISEASE WITH HEART FAILURE AND STAGE 1 THROUGH STAGE 4 CHRONIC KIDNEY DISEASE, OR UNSPECIFIED CHRONIC KIDNEY DISEASE: ICD-10-CM

## 2025-04-09 DIAGNOSIS — I48.0 PAROXYSMAL ATRIAL FIBRILLATION: ICD-10-CM

## 2025-04-09 DIAGNOSIS — I50.20 UNSPECIFIED SYSTOLIC (CONGESTIVE) HEART FAILURE: ICD-10-CM

## 2025-04-09 DIAGNOSIS — Z79.01 LONG TERM (CURRENT) USE OF ANTICOAGULANTS: ICD-10-CM

## 2025-04-09 DIAGNOSIS — I42.8 OTHER CARDIOMYOPATHIES: ICD-10-CM

## 2025-04-09 DIAGNOSIS — N40.0 BENIGN PROSTATIC HYPERPLASIA WITHOUT LOWER URINARY TRACT SYMPTOMS: ICD-10-CM

## 2025-04-09 DIAGNOSIS — E78.00 PURE HYPERCHOLESTEROLEMIA, UNSPECIFIED: ICD-10-CM

## 2025-04-09 DIAGNOSIS — I47.29 OTHER VENTRICULAR TACHYCARDIA: ICD-10-CM

## 2025-04-09 DIAGNOSIS — I25.10 ATHEROSCLEROTIC HEART DISEASE OF NATIVE CORONARY ARTERY WITHOUT ANGINA PECTORIS: ICD-10-CM

## 2025-04-09 DIAGNOSIS — I49.3 VENTRICULAR PREMATURE DEPOLARIZATION: ICD-10-CM

## 2025-04-24 ENCOUNTER — APPOINTMENT (OUTPATIENT)
Dept: ELECTROPHYSIOLOGY | Facility: CLINIC | Age: 85
End: 2025-04-24

## 2025-04-24 VITALS
BODY MASS INDEX: 23.38 KG/M2 | SYSTOLIC BLOOD PRESSURE: 130 MMHG | HEART RATE: 68 BPM | HEIGHT: 71 IN | DIASTOLIC BLOOD PRESSURE: 60 MMHG | WEIGHT: 167 LBS | OXYGEN SATURATION: 94 %

## 2025-04-24 DIAGNOSIS — Z45.02 ENCOUNTER FOR ADJUSTMENT AND MANAGEMENT OF AUTOMATIC IMPLANTABLE CARDIAC DEFIBRILLATOR: ICD-10-CM

## 2025-04-24 DIAGNOSIS — Z48.89 ENCOUNTER FOR OTHER SPECIFIED SURGICAL AFTERCARE: ICD-10-CM

## 2025-04-24 DIAGNOSIS — I47.20 VENTRICULAR TACHYCARDIA, UNSPECIFIED: ICD-10-CM

## 2025-04-24 PROCEDURE — 99213 OFFICE O/P EST LOW 20 MIN: CPT

## 2025-04-24 PROCEDURE — 99024 POSTOP FOLLOW-UP VISIT: CPT

## 2025-04-24 PROCEDURE — 93000 ELECTROCARDIOGRAM COMPLETE: CPT | Mod: 59

## 2025-04-24 PROCEDURE — 93283 PRGRMG EVAL IMPLANTABLE DFB: CPT

## 2025-04-24 RX ORDER — AMIODARONE HYDROCHLORIDE 200 MG/1
200 TABLET ORAL
Qty: 37 | Refills: 2 | Status: ACTIVE | COMMUNITY
Start: 2025-04-24 | End: 1900-01-01

## 2025-04-25 DIAGNOSIS — I42.8 OTHER CARDIOMYOPATHIES: ICD-10-CM

## 2025-04-25 RX ORDER — OXYCODONE AND ACETAMINOPHEN 5; 325 MG/1; MG/1
5-325 TABLET ORAL EVERY 6 HOURS
Refills: 0 | Status: ACTIVE | COMMUNITY
Start: 2025-04-25

## 2025-04-25 RX ORDER — SACUBITRIL AND VALSARTAN 24; 26 MG/1; MG/1
24-26 TABLET, FILM COATED ORAL TWICE DAILY
Qty: 60 | Refills: 1 | Status: ACTIVE | COMMUNITY
Start: 2025-04-25 | End: 1900-01-01

## 2025-04-25 RX ORDER — DAPAGLIFLOZIN 10 MG/1
10 TABLET, FILM COATED ORAL
Qty: 30 | Refills: 1 | Status: ACTIVE | COMMUNITY
Start: 2025-04-25 | End: 1900-01-01

## 2025-04-25 RX ORDER — METOPROLOL SUCCINATE 25 MG/1
25 TABLET, EXTENDED RELEASE ORAL DAILY
Qty: 90 | Refills: 3 | Status: ACTIVE | COMMUNITY
Start: 2025-04-25

## 2025-05-02 PROBLEM — Z48.89 ENCOUNTER FOR POSTOPERATIVE WOUND CHECK: Status: ACTIVE | Noted: 2025-04-24

## 2025-05-07 ENCOUNTER — NON-APPOINTMENT (OUTPATIENT)
Age: 85
End: 2025-05-07

## 2025-05-07 ENCOUNTER — APPOINTMENT (OUTPATIENT)
Dept: CARDIOLOGY | Facility: CLINIC | Age: 85
End: 2025-05-07
Payer: MEDICARE

## 2025-05-07 ENCOUNTER — RESULT CHARGE (OUTPATIENT)
Age: 85
End: 2025-05-07

## 2025-05-07 VITALS
OXYGEN SATURATION: 97 % | WEIGHT: 167 LBS | DIASTOLIC BLOOD PRESSURE: 56 MMHG | HEART RATE: 49 BPM | HEIGHT: 71 IN | SYSTOLIC BLOOD PRESSURE: 106 MMHG | BODY MASS INDEX: 23.38 KG/M2

## 2025-05-07 DIAGNOSIS — I50.22 CHRONIC SYSTOLIC (CONGESTIVE) HEART FAILURE: ICD-10-CM

## 2025-05-07 DIAGNOSIS — I48.91 UNSPECIFIED ATRIAL FIBRILLATION: ICD-10-CM

## 2025-05-07 PROCEDURE — 93000 ELECTROCARDIOGRAM COMPLETE: CPT

## 2025-05-07 PROCEDURE — 99213 OFFICE O/P EST LOW 20 MIN: CPT

## 2025-05-07 RX ORDER — DAPAGLIFLOZIN 5 MG/1
5 TABLET, FILM COATED ORAL DAILY
Qty: 90 | Refills: 3 | Status: ACTIVE | COMMUNITY
Start: 1900-01-01 | End: 1900-01-01

## 2025-05-29 ENCOUNTER — NON-APPOINTMENT (OUTPATIENT)
Age: 85
End: 2025-05-29

## 2025-05-29 ENCOUNTER — APPOINTMENT (OUTPATIENT)
Dept: ELECTROPHYSIOLOGY | Facility: CLINIC | Age: 85
End: 2025-05-29
Payer: MEDICARE

## 2025-05-29 VITALS
HEART RATE: 51 BPM | BODY MASS INDEX: 23.38 KG/M2 | HEIGHT: 71 IN | WEIGHT: 167 LBS | SYSTOLIC BLOOD PRESSURE: 150 MMHG | DIASTOLIC BLOOD PRESSURE: 70 MMHG

## 2025-05-29 DIAGNOSIS — I48.91 UNSPECIFIED ATRIAL FIBRILLATION: ICD-10-CM

## 2025-05-29 DIAGNOSIS — I42.8 OTHER CARDIOMYOPATHIES: ICD-10-CM

## 2025-05-29 DIAGNOSIS — I10 ESSENTIAL (PRIMARY) HYPERTENSION: ICD-10-CM

## 2025-05-29 DIAGNOSIS — I50.22 CHRONIC SYSTOLIC (CONGESTIVE) HEART FAILURE: ICD-10-CM

## 2025-05-29 DIAGNOSIS — I47.20 VENTRICULAR TACHYCARDIA, UNSPECIFIED: ICD-10-CM

## 2025-05-29 DIAGNOSIS — Z45.02 ENCOUNTER FOR ADJUSTMENT AND MANAGEMENT OF AUTOMATIC IMPLANTABLE CARDIAC DEFIBRILLATOR: ICD-10-CM

## 2025-05-29 PROCEDURE — 93000 ELECTROCARDIOGRAM COMPLETE: CPT | Mod: 59

## 2025-05-29 PROCEDURE — 99214 OFFICE O/P EST MOD 30 MIN: CPT

## 2025-05-29 PROCEDURE — 93290 INTERROG DEV EVAL ICPMS IP: CPT

## 2025-05-29 PROCEDURE — 93283 PRGRMG EVAL IMPLANTABLE DFB: CPT

## 2025-05-29 RX ORDER — AMIODARONE HYDROCHLORIDE 200 MG/1
200 TABLET ORAL DAILY
Refills: 0 | Status: ACTIVE | COMMUNITY

## 2025-05-29 RX ORDER — ATORVASTATIN CALCIUM 10 MG/1
10 TABLET, FILM COATED ORAL EVERY OTHER DAY
Refills: 0 | Status: ACTIVE | COMMUNITY

## 2025-06-02 LAB
ALBUMIN SERPL ELPH-MCNC: 4.7 G/DL
ALP BLD-CCNC: 60 U/L
ALT SERPL-CCNC: 12 U/L
ANION GAP SERPL CALC-SCNC: 16 MMOL/L
AST SERPL-CCNC: 20 U/L
BILIRUB SERPL-MCNC: 0.9 MG/DL
BUN SERPL-MCNC: 31 MG/DL
CALCIUM SERPL-MCNC: 9.4 MG/DL
CHLORIDE SERPL-SCNC: 105 MMOL/L
CO2 SERPL-SCNC: 19 MMOL/L
CREAT SERPL-MCNC: 1.7 MG/DL
EGFRCR SERPLBLD CKD-EPI 2021: 39 ML/MIN/1.73M2
GLUCOSE SERPL-MCNC: 97 MG/DL
HCT VFR BLD CALC: 49 %
HGB BLD-MCNC: 15.5 G/DL
MCHC RBC-ENTMCNC: 31.6 G/DL
MCHC RBC-ENTMCNC: 32.2 PG
MCV RBC AUTO: 101.7 FL
PLATELET # BLD AUTO: 131 K/UL
PMV BLD AUTO: 0 /100 WBCS
PMV BLD: NORMAL
POTASSIUM SERPL-SCNC: 5.8 MMOL/L
PROT SERPL-MCNC: 6.8 G/DL
RBC # BLD: 4.82 M/UL
RBC # FLD: 14.1 %
SODIUM SERPL-SCNC: 140 MMOL/L
T4 FREE SERPL-MCNC: 1.5 NG/DL
TSH SERPL-ACNC: 2.65 UIU/ML
WBC # FLD AUTO: 5.13 K/UL

## 2025-08-01 ENCOUNTER — EMERGENCY (EMERGENCY)
Facility: HOSPITAL | Age: 85
LOS: 0 days | Discharge: HOME | End: 2025-08-01
Attending: STUDENT IN AN ORGANIZED HEALTH CARE EDUCATION/TRAINING PROGRAM
Payer: MEDICARE

## 2025-08-01 VITALS
OXYGEN SATURATION: 100 % | SYSTOLIC BLOOD PRESSURE: 137 MMHG | HEART RATE: 79 BPM | DIASTOLIC BLOOD PRESSURE: 61 MMHG | RESPIRATION RATE: 18 BRPM

## 2025-08-01 VITALS
HEART RATE: 69 BPM | RESPIRATION RATE: 20 BRPM | OXYGEN SATURATION: 98 % | SYSTOLIC BLOOD PRESSURE: 140 MMHG | TEMPERATURE: 98 F | DIASTOLIC BLOOD PRESSURE: 82 MMHG | WEIGHT: 169.98 LBS | HEIGHT: 71 IN

## 2025-08-01 DIAGNOSIS — Z79.01 LONG TERM (CURRENT) USE OF ANTICOAGULANTS: ICD-10-CM

## 2025-08-01 DIAGNOSIS — R91.1 SOLITARY PULMONARY NODULE: ICD-10-CM

## 2025-08-01 DIAGNOSIS — S01.81XA LACERATION WITHOUT FOREIGN BODY OF OTHER PART OF HEAD, INITIAL ENCOUNTER: ICD-10-CM

## 2025-08-01 DIAGNOSIS — Z86.79 PERSONAL HISTORY OF OTHER DISEASES OF THE CIRCULATORY SYSTEM: ICD-10-CM

## 2025-08-01 DIAGNOSIS — N17.9 ACUTE KIDNEY FAILURE, UNSPECIFIED: ICD-10-CM

## 2025-08-01 DIAGNOSIS — R42 DIZZINESS AND GIDDINESS: ICD-10-CM
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
## 2025-08-01 DIAGNOSIS — Z95.828 PRESENCE OF OTHER VASCULAR IMPLANTS AND GRAFTS: Chronic | ICD-10-CM
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
## 2025-08-01 DIAGNOSIS — E78.5 HYPERLIPIDEMIA, UNSPECIFIED: ICD-10-CM

## 2025-08-01 DIAGNOSIS — N40.0 BENIGN PROSTATIC HYPERPLASIA WITHOUT LOWER URINARY TRACT SYMPTOMS: ICD-10-CM

## 2025-08-01 DIAGNOSIS — Z95.810 PRESENCE OF AUTOMATIC (IMPLANTABLE) CARDIAC DEFIBRILLATOR: ICD-10-CM

## 2025-08-01 LAB
ALBUMIN SERPL ELPH-MCNC: 4.3 G/DL — SIGNIFICANT CHANGE UP (ref 3.5–5.2)
ALP SERPL-CCNC: 84 U/L — SIGNIFICANT CHANGE UP (ref 30–115)
ALT FLD-CCNC: 10 U/L — SIGNIFICANT CHANGE UP (ref 0–41)
ANION GAP SERPL CALC-SCNC: 10 MMOL/L — SIGNIFICANT CHANGE UP (ref 7–14)
APTT BLD: 31.5 SEC — SIGNIFICANT CHANGE UP (ref 27–39.2)
AST SERPL-CCNC: 13 U/L — SIGNIFICANT CHANGE UP (ref 0–41)
BASOPHILS # BLD AUTO: 0.05 K/UL — SIGNIFICANT CHANGE UP (ref 0–0.2)
BASOPHILS NFR BLD AUTO: 0.7 % — SIGNIFICANT CHANGE UP (ref 0–1)
BILIRUB SERPL-MCNC: 0.5 MG/DL — SIGNIFICANT CHANGE UP (ref 0.2–1.2)
BUN SERPL-MCNC: 34 MG/DL — HIGH (ref 10–20)
CALCIUM SERPL-MCNC: 9 MG/DL — SIGNIFICANT CHANGE UP (ref 8.4–10.5)
CHLORIDE SERPL-SCNC: 103 MMOL/L — SIGNIFICANT CHANGE UP (ref 98–110)
CO2 SERPL-SCNC: 25 MMOL/L — SIGNIFICANT CHANGE UP (ref 17–32)
CREAT SERPL-MCNC: 1.7 MG/DL — HIGH (ref 0.7–1.5)
EGFR: 39 ML/MIN/1.73M2 — LOW
EGFR: 39 ML/MIN/1.73M2 — LOW
EOSINOPHIL # BLD AUTO: 0.11 K/UL — SIGNIFICANT CHANGE UP (ref 0–0.7)
EOSINOPHIL NFR BLD AUTO: 1.5 % — SIGNIFICANT CHANGE UP (ref 0–8)
GLUCOSE SERPL-MCNC: 86 MG/DL — SIGNIFICANT CHANGE UP (ref 70–99)
HCT VFR BLD CALC: 41.2 % — LOW (ref 42–52)
HGB BLD-MCNC: 13.6 G/DL — LOW (ref 14–18)
IMM GRANULOCYTES NFR BLD AUTO: 0.7 % — HIGH (ref 0.1–0.3)
INR BLD: 1.16 RATIO — SIGNIFICANT CHANGE UP (ref 0.65–1.3)
LACTATE SERPL-SCNC: 1.2 MMOL/L — SIGNIFICANT CHANGE UP (ref 0.7–2)
LIDOCAIN IGE QN: 92 U/L — HIGH (ref 7–60)
LYMPHOCYTES # BLD AUTO: 1.57 K/UL — SIGNIFICANT CHANGE UP (ref 1.2–3.4)
LYMPHOCYTES # BLD AUTO: 21.2 % — SIGNIFICANT CHANGE UP (ref 20.5–51.1)
MCHC RBC-ENTMCNC: 30.8 PG — SIGNIFICANT CHANGE UP (ref 27–31)
MCHC RBC-ENTMCNC: 33 G/DL — SIGNIFICANT CHANGE UP (ref 32–37)
MCV RBC AUTO: 93.4 FL — SIGNIFICANT CHANGE UP (ref 80–94)
MONOCYTES # BLD AUTO: 0.72 K/UL — HIGH (ref 0.1–0.6)
MONOCYTES NFR BLD AUTO: 9.7 % — HIGH (ref 1.7–9.3)
NEUTROPHILS # BLD AUTO: 4.91 K/UL — SIGNIFICANT CHANGE UP (ref 1.4–6.5)
NEUTROPHILS NFR BLD AUTO: 66.2 % — SIGNIFICANT CHANGE UP (ref 42.2–75.2)
NRBC BLD AUTO-RTO: 0 /100 WBCS — SIGNIFICANT CHANGE UP (ref 0–0)
PLATELET # BLD AUTO: 148 K/UL — SIGNIFICANT CHANGE UP (ref 130–400)
PMV BLD: 10.5 FL — HIGH (ref 7.4–10.4)
POTASSIUM SERPL-MCNC: 4.9 MMOL/L — SIGNIFICANT CHANGE UP (ref 3.5–5)
POTASSIUM SERPL-SCNC: 4.9 MMOL/L — SIGNIFICANT CHANGE UP (ref 3.5–5)
PROT SERPL-MCNC: 6.4 G/DL — SIGNIFICANT CHANGE UP (ref 6–8)
PROTHROM AB SERPL-ACNC: 13.7 SEC — HIGH (ref 9.95–12.87)
RBC # BLD: 4.41 M/UL — LOW (ref 4.7–6.1)
RBC # FLD: 13.1 % — SIGNIFICANT CHANGE UP (ref 11.5–14.5)
SODIUM SERPL-SCNC: 138 MMOL/L — SIGNIFICANT CHANGE UP (ref 135–146)
TROPONIN T, HIGH SENSITIVITY RESULT: 19 NG/L — SIGNIFICANT CHANGE UP (ref 6–21)
TROPONIN T, HIGH SENSITIVITY RESULT: 20 NG/L — SIGNIFICANT CHANGE UP (ref 6–21)
WBC # BLD: 7.41 K/UL — SIGNIFICANT CHANGE UP (ref 4.8–10.8)
WBC # FLD AUTO: 7.41 K/UL — SIGNIFICANT CHANGE UP (ref 4.8–10.8)

## 2025-08-01 PROCEDURE — 36415 COLL VENOUS BLD VENIPUNCTURE: CPT

## 2025-08-01 PROCEDURE — 72170 X-RAY EXAM OF PELVIS: CPT

## 2025-08-01 PROCEDURE — 93010 ELECTROCARDIOGRAM REPORT: CPT

## 2025-08-01 PROCEDURE — 83690 ASSAY OF LIPASE: CPT

## 2025-08-01 PROCEDURE — 71260 CT THORAX DX C+: CPT | Mod: 26

## 2025-08-01 PROCEDURE — 99284 EMERGENCY DEPT VISIT MOD MDM: CPT

## 2025-08-01 PROCEDURE — 72125 CT NECK SPINE W/O DYE: CPT | Mod: 26

## 2025-08-01 PROCEDURE — 71045 X-RAY EXAM CHEST 1 VIEW: CPT | Mod: 26

## 2025-08-01 PROCEDURE — 70450 CT HEAD/BRAIN W/O DYE: CPT

## 2025-08-01 PROCEDURE — 82962 GLUCOSE BLOOD TEST: CPT

## 2025-08-01 PROCEDURE — 84484 ASSAY OF TROPONIN QUANT: CPT

## 2025-08-01 PROCEDURE — 72170 X-RAY EXAM OF PELVIS: CPT | Mod: 26

## 2025-08-01 PROCEDURE — 74177 CT ABD & PELVIS W/CONTRAST: CPT | Mod: 26

## 2025-08-01 PROCEDURE — 74177 CT ABD & PELVIS W/CONTRAST: CPT

## 2025-08-01 PROCEDURE — 12011 RPR F/E/E/N/L/M 2.5 CM/<: CPT

## 2025-08-01 PROCEDURE — 93005 ELECTROCARDIOGRAM TRACING: CPT

## 2025-08-01 PROCEDURE — 85730 THROMBOPLASTIN TIME PARTIAL: CPT

## 2025-08-01 PROCEDURE — 99292 CRITICAL CARE ADDL 30 MIN: CPT | Mod: 25

## 2025-08-01 PROCEDURE — 80053 COMPREHEN METABOLIC PANEL: CPT

## 2025-08-01 PROCEDURE — 85025 COMPLETE CBC W/AUTO DIFF WBC: CPT

## 2025-08-01 PROCEDURE — 72125 CT NECK SPINE W/O DYE: CPT

## 2025-08-01 PROCEDURE — 70450 CT HEAD/BRAIN W/O DYE: CPT | Mod: 26

## 2025-08-01 PROCEDURE — 85610 PROTHROMBIN TIME: CPT

## 2025-08-01 PROCEDURE — 99291 CRITICAL CARE FIRST HOUR: CPT | Mod: 25

## 2025-08-01 PROCEDURE — 99285 EMERGENCY DEPT VISIT HI MDM: CPT

## 2025-08-01 PROCEDURE — 71260 CT THORAX DX C+: CPT

## 2025-08-01 PROCEDURE — 83605 ASSAY OF LACTIC ACID: CPT

## 2025-08-01 PROCEDURE — 71045 X-RAY EXAM CHEST 1 VIEW: CPT

## 2025-08-01 RX ORDER — SODIUM CHLORIDE 9 G/1000ML
1000 INJECTION, SOLUTION INTRAVENOUS ONCE
Refills: 0 | Status: DISCONTINUED | OUTPATIENT
Start: 2025-08-01 | End: 2025-08-01

## 2025-08-01 NOTE — CONSULT NOTE ADULT - ATTENDING COMMENTS
Discussed with ED team.  Reviewed imaging and lab work independently.    85 M, retired , s/p mechanical fall with head laceration. Take eliquis at home. Reports falling multiple times before at home. Reports felt dizzy before he fell.    PE:  General; male, in bed, comfortable  Head; superficial right forehead scalp laceration, minimal oozing  Heart; RRR  Lungs; even chest rise  Abdomen: soft, non-tender, non-distended  MSK: moving b/l UE and LE    A/P'  85 M s/p fall with pan-scan negative for any acute traumatic injuries.  - Continued care per ED team

## 2025-08-01 NOTE — ED PROVIDER NOTE - PHYSICAL EXAMINATION
GENERAL: Well-developed; well-nourished; in no acute distress. AOx3  SKIN: warm, well perfused  HEAD: 2cm lac above right eyebrow  EYES: no conjunctival erythema, ocular motions intact and appropriate  ENT: airway clear.  CARD: Regular rate and rhythm.   RESP: LCTAB; No wheezes or crackles  ABD: soft and nondistended. nontender  Upper EXT: moving b/l UEs. Rad pulses 2+ symm, sensation intact and symm  Lower EXT: moving b/l LEs, sensation intact and symm  MSK: no midline spinal tenderness

## 2025-08-01 NOTE — ED ADULT TRIAGE NOTE - SOURCE OF INFORMATION
Progress Note - General Surgery  March Sever 44 y o  male MRN: [id number]  Unit/Bed#: [facility] 820-01 Encounter: [id number]    Assessment:  44y o -year-old male with spontaneous left kidney rupture    - s/p ex lap, left nephrectomy, abthera 11/1 Tod Villarreal)  - s/p ex lap, removal of packs, ligation of left ureter, abdominal closure 11/2 Herrera Moisés)  - s/p right PCN w/ intraabdominal ascites samples 11/9 (IR)  - s/p 11/18 cystoscopy w/ fulguration of ureteral orifice, cystogram with injection of deflux, left retrograde pyelograam 11/18 Linda Herring)  - s/p nephrostogram, PCN capping 11/21 (IR)  - s/p IR tube check 11/27 (IR)  - s/p drainage of left upper quadrant fluid collection 11/30 (IR)  -s/p Left CT insertion 12/3 (Voncile Ganser)     Plan:  - adv to regular diet   - bowel regimen   - replete electrolytes   - cont eye drops for conjunctivitis  - f/u urology plan - possible CT cystogram   - continue CT to sxn   - Follow up on am CXR   - SQH and venodynes for DVT ppx   - PT/OT       Cody Oleary MD PGY-3  6:27 AM  12/04/17      Subjective:  Pt had a left CT placed yesterday and is c/o pain  No nausea or vomiting  Passing flatus but no BMs  Tolerating clear liquid diet  Tmax of 102 8 which resolved with Tylenol   PreAlbumin 11 2    Objective:  Patient Vitals for the past 24 hrs:   BP Temp Temp src Pulse Resp SpO2   12/04/17 0332 130/83 98 9 °F (37 2 °C) Oral 74 16 100 %   12/04/17 0137 - 99 1 °F (37 3 °C) - - - -   12/03/17 2300 138/84 (!) 102 8 °F (39 3 °C) Oral (!) 108 18 98 %   12/03/17 2022 142/86 100 2 °F (37 9 °C) Oral 105 18 96 %   12/03/17 1800 - - - 82 17 100 %   12/03/17 1600 - 98 7 °F (37 1 °C) Oral 104 (!) 33 96 %   12/03/17 1400 - - - 92 (!) 24 98 %   12/03/17 1200 140/95 98 5 °F (36 9 °C) Oral 92 (!) 26 100 %   12/03/17 1000 151/86 - - 104 (!) 32 93 %   12/03/17 0800 - 100 2 °F (37 9 °C) Oral 94 (!) 24 100 %          Diet Orders            Start     Ordered    12/02/17 1117  Diet Clear Liquid  Diet effective now Question Answer Comment   Diet Type Clear Liquid    RD to adjust diet per protocol?  No        12/02/17 1116    11/22/17 1646  Dietary nutrition supplements  Once     Question Answer Comment   Select Supplement: Ensure Enlive-Chocolate    Frequency Breakfast        11/22/17 1645          Intake/Output Summary (Last 24 hours) at 12/04/17 0627  Last data filed at 12/04/17 0528   Gross per 24 hour   Intake          1930 05 ml   Output             3725 ml   Net         -1794 95 ml   UOP 3 6  Back left drain 76 serosanguinous  Lateral L drain 20 serosanguinous    Physical Exam:  General: NAD  HEENT: danielle crusting around eyelashes  Cardiovascular: RRR, normal S1/S2   Respiratory: breath sounds b/l  Left CT to sxn with + air leak - serous output   Abdomen: soft, mild distension, incision c/d/i   PCN on right  2 IR drains on left - sanguinous output in 1 bag, sero-sang in the 2nd bag   Extremities: no edema    Medications:    acetaminophen 975 mg Oral Q8H   fluconazole 400 mg Intravenous Q24H   heparin (porcine) 5,000 Units Subcutaneous Q8H [address]   iron polysaccharides 150 mg Oral Daily   piperacillin-tazobactam 4 5 g Intravenous Q6H   senna-docusate sodium 1 tablet Oral BID   tamsulosin 0 4 mg Oral Daily With Dinner   tobramycin-dexamethasone 1 drop Ophthalmic Q6H [address]   vancomycin 17 5 mg/kg Intravenous Q12H       multi-electrolyte 75 mL/hr Last Rate: 75 mL/hr (12/03/17 2354)       HYDROmorphone 1 mg Q3H PRN   influenza vaccine 0 5 mL Prior to discharge   oxyCODONE 10 mg Q4H PRN   oxyCODONE 5 mg Q4H PRN     Laboratory results:   CBC:   Lab Results   Component Value Date    WBC 8 74 12/04/2017    HGB 7 8 (L) 12/04/2017    HCT 23 3 (L) 12/04/2017    MCV 88 12/04/2017     12/04/2017    MCH 29 4 12/04/2017    MCHC 33 5 12/04/2017    RDW 15 2 (H) 12/04/2017    MPV 8 4 (L) 12/04/2017    NRBC 0 12/04/2017   , CMP:   Lab Results   Component Value Date     12/04/2017    K 3 9 12/04/2017     12/04/2017    CO2 31 12/04/2017    ANIONGAP 3 (L) 12/04/2017    BUN 10 12/04/2017    CREATININE 1 02 12/04/2017    GLUCOSE 105 12/04/2017    CALCIUM 8 2 (L) 12/04/2017    EGFR 90 12/04/2017   , Coagulation:   No results found for: PT, INR, APTT, Urinalysis: No results found for: Elly Body, SPECGRAV, PHUR, LEUKOCYTESUR, NITRITE, PROTEINUA, GLUCOSEU, KETONESU, BILIRUBINUR, BLOODU, Amylase: No results found for: AMYLASE, Lipase: No results found for: LIPASE    VTE Pharmacologic Prophylaxis: SQH   VTE Mechanical Prophylaxis: sequential compression device Patient

## 2025-08-01 NOTE — ED PROVIDER NOTE - NSFOLLOWUPINSTRUCTIONS_ED_ALL_ED_FT
You were found to have a 1.6 cm nodule on your lung. This needs to be followed up in 6-12 months with a CT scan.     Falls    Your risk of falling increases as you grow older. That's because getting older can make it harder to walk steadily and keep your balance. Also, the effects of falls are more serious in older people. If you have fallen in the past, you are at higher risk of falling again.    Several things can increase your risk of a fall, including:  - Illness(es)  - A change in the medicines you take  - An unsafe or unfamiliar setting (for example, a room with rugs or furniture that might trip you, or an area you don't know well)    Is there anything I can do on my own?    Yes. To help keep from falling, you can:    - Make your home safer:   To avoid falling at home, get rid of things that might make you trip or slip. This might include furniture, electrical cords, clutter, and loose rugs. Keep your home well-lit so that you can easily see where you are going. Avoid storing things in high places so you don't have to reach or climb.    - Wear sturdy shoes that fit well:  Walking around in bare feet, or only socks, can also increase your risk of falling.    - Use a cane, walker, and other safety devices: If your doctor recommends that you use a cane or walker, be sure that it's the right size and you know how to use it. There are other devices that might help you avoid falling, too. These include grab bars or a sturdy seat for the shower, non-slip bath mats, and hand rails or treads for the stairs (to prevent slipping).    If you worry that you could fall, there are also alarm buttons that let you call for help if you fall and can't get up.    What should I do if I fall?  If you fall, see your doctor right away, even if you aren't hurt. Your doctor can try to figure out what caused you to fall, and how likely you are to fall again. He or she will do an exam and talk to you about your health problems, medicines, and activities. Then he or she can suggest things you can do to avoid falling again.    Many older people have a hard time recovering after a fall. Doing things to prevent falling can help you to protect your health and independence.    Dizziness    Dizziness can manifest as a feeling of unsteadiness or light-headedness. You may feel like you are about to faint. This condition can be caused by a number of things, including medicines, dehydration, or illness. Drink enough fluid to keep your urine clear or pale yellow. Do not drink alcohol and limit your caffeine intake. Avoid quick or sudden movements.  Rise slowly from chairs and steady yourself until you feel okay. In the morning, first sit up on the side of the bed.    SEEK IMMEDIATE MEDICAL CARE IF YOU HAVE ANY OF THE FOLLOWING SYMPTOMS: vomiting, changes in your vision or speech, weakness in your arms or legs, trouble speaking or swallowing, chest pain, abdominal pain, shortness of breath, sweating, bleeding, headache, neck pain, or fever.

## 2025-08-01 NOTE — ED ADULT TRIAGE NOTE - CHIEF COMPLAINT QUOTE
Pt felt dizzy today and fell, + head injury, laceration to R forehead, on Eliquis, trauma alert activated in triage, aox4, gcs 15

## 2025-08-01 NOTE — ED PROVIDER NOTE - CLINICAL SUMMARY MEDICAL DECISION MAKING FREE TEXT BOX
Patient with BPH V. tach with defibrillator presents status post fall on anticoagulation mild dizziness patient had a recent echo that was negative in April CTs are negative positive head trauma laceration of the eyebrow which was repaired    Independently interpretted by Chan Montes DO:  XR interpretation: No cardiopulmonary disease,   EKG interpretation: no JOVAN, NSR    Patient has an appointment with cardiologist and PCP    Appropriate medications for patient's presenting complaints were ordered and effects were reassessed.   Patient's external records were reviewed.     Escalation to admission and/or observation was considered.  Patient feels much better and is comfortable with discharge.  Appropriate follow-up was arranged.

## 2025-08-01 NOTE — ED PROVIDER NOTE - PROGRESS NOTE DETAILS
HANNY Mcintosh MD:  Results back, discussed w. pt and daughter. Offered admission but shared decision making of discharging pt.   Incidental finding of lung nodule, pt and daughter made aware of need to f/u w. CT in 6/12 m.  Pt. has known MONICO that is being evaluated by her cadiologist ( stopped a few cardiac medications that could be causing it)  Pt. has appt w. ENT to r/o vertigo  Return precautions discussed

## 2025-08-01 NOTE — ED PROVIDER NOTE - OBJECTIVE STATEMENT
85-year-old male, past medical history of BPH, hyperlipidemia, V. tach with defibrillator placed in April 2025, on Eliquis and metoprolol, brought in by EMS for fall today around 5 PM.  Patient has a history of dizziness, has been having dizzy spells.  Today patient was feeling dizzy, fell onto the floor hitting his head sustaining a lack to his forehead.  Was witnessed by wife, no LOC.  Was ambulatory on scene no pain to his extremities.  Denies fevers, chills, chest pain, shortness of breath, abdominal pain, dysuria, hematuria, diarrhea.

## 2025-08-01 NOTE — CONSULT NOTE ADULT - SUBJECTIVE AND OBJECTIVE BOX
TRAUMA ACTIVATION LEVEL:   ALERT   ACTIVATED BY:  ED**  INTUBATED: NO**      MECHANISM OF INJURY: 	  [x] Fall	      GCS: 15 	E: 4	V: 5	M: 6    HPI:    85yM w/ PMHx of HLD, recent AICD placement, on eliquis (last taken this morning) seen as a Trauma Alert  s/p syncopal fall, +ht, +loc, +eliquis. The patient states he had a dizzy spell and fell to the ground striking his head on the floor, the fall was witnessed by his wife (his daughter is at bedside), states he remembers the entire incident, was able to get up afterwards and ambulate. The patient denies any pain, only external sign of trauma is a small right forehead laceration (1cm, no active bleeding).  Trauma assessment in ED: ABCs intact , GCS 15 , AAOx3.    PAST MEDICAL & SURGICAL HISTORY:  High cholesterol  BPH (benign prostatic hyperplasia)  S/P AAA repair  2014 stent 2014  History of intravascular stent placement  2014 aaa      Allergies    No Known Allergies    Intolerances        Home Medications:  atorvastatin 10 mg oral tablet: 1 tab(s) orally every 48 hours (21 Mar 2025 23:44)  Flomax 0.4 mg oral capsule: 1 cap(s) orally once a day (26 Jun 2020 10:41)      ROS: 10-system review is otherwise negative except HPI above.      Primary Survey:    A - airway intact  B - bilateral breath sounds and good chest rise  C - palpable pulses in all extremities  D - GCS 15 on arrival, DURON  Exposure obtained    Vital Signs Last 24 Hrs  T(C): 36.7 (01 Aug 2025 17:59), Max: 36.7 (01 Aug 2025 17:59)  T(F): 98 (01 Aug 2025 17:59), Max: 98 (01 Aug 2025 17:59)  HR: 79 (01 Aug 2025 18:15) (59 - 79)  BP: 137/61 (01 Aug 2025 18:15) (129/76 - 140/82)  BP(mean): --  RR: 18 (01 Aug 2025 18:15) (18 - 20)  SpO2: 100% (01 Aug 2025 18:15) (98% - 100%)    Parameters below as of 01 Aug 2025 18:15  Patient On (Oxygen Delivery Method): room air        Secondary Survey:   General: NAD  HEENT: Normocephalic, atraumatic, EOMI, PEERLA. right forehead laceration  Neck: Soft, midline trachea. no c-spine tenderness, C collar placed in ED.  Chest: No chest wall tenderness, no subcutaneous emphysema. +aicd in place  Cardiac: S1, S2, RRR  Respiratory: Bilateral breath sounds, clear and equal bilaterally  Abdomen: Soft, non-distended, non-tender, no rebound, no guarding.  Groin: Normal appearing, pelvis stable   Ext:  Moving b/l upper and lower extremities. Palpable Radial b/l UE, b/l DP palpable in LE.   Back: No T/L/S spine tenderness, No palpable runoff/stepoff/deformity    FAST: pending    ACCESS / DEVICES:  [x ] Peripheral IV    Labs:  pending                        RADIOLOGY & ADDITIONAL STUDIES:  pending  ---------------------------------------------------------------------------------------     TRAUMA ACTIVATION LEVEL:   ALERT   ACTIVATED BY:  ED**  INTUBATED: NO**      MECHANISM OF INJURY: 	  [x] Fall	      GCS: 15 	E: 4	V: 5	M: 6    HPI:    85yM w/ PMHx of HLD, recent AICD placement, on eliquis (last taken this morning) seen as a Trauma Alert  s/p syncopal fall, +ht, +loc, +eliquis. The patient states he had a dizzy spell and fell to the ground striking his head on the floor, the fall was witnessed by his wife (his daughter is at bedside), states he remembers the entire incident, was able to get up afterwards and ambulate. The patient denies any pain, only external sign of trauma is a small right forehead laceration (1cm, no active bleeding).  Trauma assessment in ED: ABCs intact , GCS 15 , AAOx3.    PAST MEDICAL & SURGICAL HISTORY:  High cholesterol  BPH (benign prostatic hyperplasia)  S/P AAA repair  2014 stent 2014  History of intravascular stent placement  2014 aaa      Allergies    No Known Allergies    Intolerances        Home Medications:  atorvastatin 10 mg oral tablet: 1 tab(s) orally every 48 hours (21 Mar 2025 23:44)  Flomax 0.4 mg oral capsule: 1 cap(s) orally once a day (26 Jun 2020 10:41)      ROS: 10-system review is otherwise negative except HPI above.      Primary Survey:    A - airway intact  B - bilateral breath sounds and good chest rise  C - palpable pulses in all extremities  D - GCS 15 on arrival, DURON  Exposure obtained    Vital Signs Last 24 Hrs  T(C): 36.7 (01 Aug 2025 17:59), Max: 36.7 (01 Aug 2025 17:59)  T(F): 98 (01 Aug 2025 17:59), Max: 98 (01 Aug 2025 17:59)  HR: 79 (01 Aug 2025 18:15) (59 - 79)  BP: 137/61 (01 Aug 2025 18:15) (129/76 - 140/82)  BP(mean): --  RR: 18 (01 Aug 2025 18:15) (18 - 20)  SpO2: 100% (01 Aug 2025 18:15) (98% - 100%)    Parameters below as of 01 Aug 2025 18:15  Patient On (Oxygen Delivery Method): room air        Secondary Survey:   General: NAD  HEENT: Normocephalic, atraumatic, EOMI, PEERLA. right forehead laceration  Neck: Soft, midline trachea. no c-spine tenderness, C collar placed in ED.  Chest: No chest wall tenderness, no subcutaneous emphysema. +aicd in place  Cardiac: S1, S2, RRR  Respiratory: Bilateral breath sounds, clear and equal bilaterally  Abdomen: Soft, non-distended, non-tender, no rebound, no guarding.  Groin: Normal appearing, pelvis stable   Ext:  Moving b/l upper and lower extremities. Palpable Radial b/l UE, b/l DP palpable in LE.   Back: No T/L/S spine tenderness, No palpable runoff/stepoff/deformity    FAST: pending    ACCESS / DEVICES:  [x ] Peripheral IV    Labs:  pending                        RADIOLOGY & ADDITIONAL STUDIES:  pending  ---------------------------------------------------------------------------------------  < from: CT Abdomen and Pelvis w/ IV Cont (08.01.25 @ 19:29) >  IMPRESSION:  No CT evidence of acute traumatic injury within the chest, abdomen or   pelvis.    CHEST:  Right lower lobe 1.6 x 0.7 cm nodular opacity. Left lower lobe 0.5 cm   nodule. Recommend follow-up CT chest in 6-12 months.    --- End of Report ---        < end of copied text >  < from: CT Cervical Spine No Cont (08.01.25 @ 19:27) >  IMPRESSION:    CT HEAD:  No acute intracranial findings.    Mild chronic microvascular ischemic changes, progressed since 2007.    CT CERVICAL SPINE:  No acute cervical fracture or facet subluxation.    --- End of Report ---    < end of copied text >    < from: Xray Pelvis AP only (08.01.25 @ 18:56) >  IMPRESSION:    No acute displaced fracture.    --- End of Report ---    < end of copied text >  < from: Xray Chest 2 Views PA/Lat (03.28.25 @ 08:00) >  IMPRESSION:    No radiographic evidence of acute cardiopulmonary disease. Support   devices as described.    --- End of Report ---    < end of copied text >

## 2025-08-01 NOTE — ED PROVIDER NOTE - WR INTERPRETATION 1
MSK XR negative - No fracture, No dislocation, No foreign bodyCXR negative - No CHF, No cardiomegaly, No pleural effusions

## 2025-08-01 NOTE — ED PROVIDER NOTE - PATIENT PORTAL LINK FT
You can access the FollowMyHealth Patient Portal offered by [facility] by registering at the following website: [email or web address]. By joining [facility]’s FollowMyHealth portal, you will also be able to view your health information using other applications (apps) compatible with our system.

## 2025-08-01 NOTE — CONSULT NOTE ADULT - ASSESSMENT
ASSESSMENT:  85y Male  w/ PMHx of HLD, Hx of AAA repair (2014), recent  AICD placement seen as a Trauma Alert s/p syncopal fall, +ht, +loc, +eliquis (last taken this morning) with no complaints of pain , external signs of trauma include right sided forehead laceration . Trauma assessment in ED: ABCs intact , GCS 15 , AAOx3,  DURON.     Injuries identified:   - Right sided forehead laceration (1cm)  - pending  -     PLAN:   - Trauma Labs: (CBC, BMP, Coags, T&S, UA, EtOH level)  -EKG  - CXR, Pelvic Xray  - CT Head,  CT C-spine,   - Extremity films: None    Disposition pending results of above labs and imaging  Above plan discussed with Trauma attending, Dr. Chacon, patient, patient family, and ED team  --------------------------------------------------------------------------------------  08-01-25 @ 18:26 ASSESSMENT:  85y Male  w/ PMHx of HLD, Hx of AAA repair (2014), recent  AICD placement seen as a Trauma Alert s/p syncopal fall, +ht, +loc, +eliquis (last taken this morning) with no complaints of pain , external signs of trauma include right sided forehead laceration . Trauma assessment in ED: ABCs intact , GCS 15 , AAOx3,  DURON.     Injuries identified:   - Right sided forehead laceration (1cm)  - No other injuries identified on radiological imaging  -     PLAN:   - Clear to discharge from trauma standpoint  - Dispo per ED team    Disposition pending results of above labs and imaging  Above plan discussed with Trauma attending, Dr. Chacon, patient, patient family, and ED team  --------------------------------------------------------------------------------------  08-01-25 @ 18:26

## 2025-08-05 ENCOUNTER — NON-APPOINTMENT (OUTPATIENT)
Age: 85
End: 2025-08-05

## 2025-08-05 ENCOUNTER — APPOINTMENT (OUTPATIENT)
Dept: ELECTROPHYSIOLOGY | Facility: CLINIC | Age: 85
End: 2025-08-05
Payer: MEDICARE

## 2025-08-05 VITALS
WEIGHT: 170 LBS | SYSTOLIC BLOOD PRESSURE: 126 MMHG | DIASTOLIC BLOOD PRESSURE: 60 MMHG | BODY MASS INDEX: 23.8 KG/M2 | HEIGHT: 71 IN | HEART RATE: 61 BPM

## 2025-08-05 DIAGNOSIS — I10 ESSENTIAL (PRIMARY) HYPERTENSION: ICD-10-CM

## 2025-08-05 DIAGNOSIS — Z45.02 ENCOUNTER FOR ADJUSTMENT AND MANAGEMENT OF AUTOMATIC IMPLANTABLE CARDIAC DEFIBRILLATOR: ICD-10-CM

## 2025-08-05 DIAGNOSIS — I50.20 UNSPECIFIED SYSTOLIC (CONGESTIVE) HEART FAILURE: ICD-10-CM

## 2025-08-05 DIAGNOSIS — I47.20 VENTRICULAR TACHYCARDIA, UNSPECIFIED: ICD-10-CM

## 2025-08-05 PROCEDURE — 93000 ELECTROCARDIOGRAM COMPLETE: CPT | Mod: 59

## 2025-08-05 PROCEDURE — 99214 OFFICE O/P EST MOD 30 MIN: CPT

## 2025-08-05 PROCEDURE — 93283 PRGRMG EVAL IMPLANTABLE DFB: CPT

## 2025-09-04 ENCOUNTER — RX RENEWAL (OUTPATIENT)
Age: 85
End: 2025-09-04

## 2025-09-08 ENCOUNTER — RX RENEWAL (OUTPATIENT)
Age: 85
End: 2025-09-08

## 2025-09-16 ENCOUNTER — APPOINTMENT (OUTPATIENT)
Dept: VASCULAR SURGERY | Facility: CLINIC | Age: 85
End: 2025-09-16
Payer: MEDICARE

## 2025-09-16 VITALS
HEIGHT: 71 IN | WEIGHT: 170 LBS | SYSTOLIC BLOOD PRESSURE: 125 MMHG | BODY MASS INDEX: 23.8 KG/M2 | DIASTOLIC BLOOD PRESSURE: 74 MMHG | HEART RATE: 76 BPM

## 2025-09-16 DIAGNOSIS — I71.40 ABDOMINAL AORTIC ANEURYSM, WITHOUT RUPTURE, UNSPECIFIED: ICD-10-CM

## 2025-09-16 PROCEDURE — 99213 OFFICE O/P EST LOW 20 MIN: CPT

## 2025-09-16 PROCEDURE — 93978 VASCULAR STUDY: CPT
